# Patient Record
Sex: MALE | Race: BLACK OR AFRICAN AMERICAN | NOT HISPANIC OR LATINO | Employment: UNEMPLOYED | URBAN - METROPOLITAN AREA
[De-identification: names, ages, dates, MRNs, and addresses within clinical notes are randomized per-mention and may not be internally consistent; named-entity substitution may affect disease eponyms.]

---

## 2017-01-23 ENCOUNTER — TELEPHONE (OUTPATIENT)
Dept: OPHTHALMOLOGY | Facility: CLINIC | Age: 42
End: 2017-01-23

## 2017-01-24 ENCOUNTER — INITIAL CONSULT (OUTPATIENT)
Dept: OPHTHALMOLOGY | Facility: CLINIC | Age: 42
End: 2017-01-24
Payer: MEDICAID

## 2017-01-24 DIAGNOSIS — H33.022 RETINAL DETACHMENT OF LEFT EYE WITH MULTIPLE BREAKS: Primary | ICD-10-CM

## 2017-01-24 DIAGNOSIS — H27.02 APHAKIA, LEFT EYE: ICD-10-CM

## 2017-01-24 DIAGNOSIS — H31.012 MACULAR SCAR OF LEFT EYE: ICD-10-CM

## 2017-01-24 PROCEDURE — 92225 PR SPECIAL EYE EXAM, INITIAL: CPT | Mod: PBBFAC,LT | Performed by: OPHTHALMOLOGY

## 2017-01-24 PROCEDURE — 99999 PR PBB SHADOW E&M-EST. PATIENT-LVL II: CPT | Mod: PBBFAC,,, | Performed by: OPHTHALMOLOGY

## 2017-01-24 PROCEDURE — 92004 COMPRE OPH EXAM NEW PT 1/>: CPT | Mod: S$PBB,,, | Performed by: OPHTHALMOLOGY

## 2017-01-24 PROCEDURE — 92225 PR SPECIAL EYE EXAM, INITIAL: CPT | Mod: S$PBB,LT,, | Performed by: OPHTHALMOLOGY

## 2017-01-24 PROCEDURE — 99212 OFFICE O/P EST SF 10 MIN: CPT | Mod: PBBFAC | Performed by: OPHTHALMOLOGY

## 2017-01-24 NOTE — MR AVS SNAPSHOT
"    [facility] - Ophthalmology  [address]  [place] [address]  Phone: [phone]  Fax: [phone]                  Cristiano German   2017 1:30 PM   Initial consult    Description:  Male : 1975   Provider:  SIRENA Leong MD   Department:  [facility] - Ophthalmology           Reason for Visit     Eye Problem           Diagnoses this Visit        Comments    Retinal detachment of left eye with multiple breaks    -  Primary     Macular scar of left eye         Aphakia, left eye                To Do List           Goals (5 Years of Data)     None      Follow-Up and Disposition     Return in about 1 year (around 2018).      [facility]     [facility] Call Nurse [facility] -  Assistance  Registered nurses in the [facility] Call Center provide clinical advisement, health education, appointment booking, and other advisory services.  Call for this free service at [phone].             Medications           Message regarding Medications     Verify the changes and/or additions to your medication regime listed below are the same as discussed with your clinician today.  If any of these changes or additions are incorrect, please notify your healthcare provider.             Verify that the below list of medications is an accurate representation of the medications you are currently taking.  If none reported, the list may be blank. If incorrect, please contact your healthcare provider. Carry this list with you in case of emergency.           Current Medications     blood sugar diagnostic (RELION PRIME TEST STRIPS) Strp 1 strip by Misc.(Non-Drug; Combo Route) route 2 (two) times daily.    blood-glucose meter kit Use as instructed    insulin glargine, TOUJEO, (TOUJEO SOLOSTAR) 300 unit/mL (1.5 mL) InPn pen Inject 25 Units into the skin once daily.    insulin syringe-needle U-100 1/2 mL 31 x 5/16" Syrg 1 Syringe by Misc.(Non-Drug; Combo Route) route 2 (two) times daily.    lancets " (RELION ULTRA THIN PLUS LANCETS) Misc 1 lancet by Misc.(Non-Drug; Combo Route) route 2 (two) times daily.    lisinopril (PRINIVIL,ZESTRIL) 5 MG tablet Take 1 tablet (5 mg total) by mouth once daily.           Clinical Reference Information           Allergies as of 1/24/2017     No Known Allergies      Immunizations Administered on Date of Encounter - 1/24/2017     None      [facility] Sign-Up     Activating your [facility] account is as easy as 1-2-3!     1) Visit [email or web address], select Sign Up Now, enter this activation code and your date of birth, then select Next.  [email or web address]  Expires: 3/10/2017  3:25 PM      2) Create a username and password to use when you visit [facility] in the future and select a security question in case you lose your password and select Next.    3) Enter your e-mail address and click Sign Up!    Additional Information  If you have questions, please e-mail [email or web address] or call [phone] to talk to our [facility] staff. Remember, [facility] is NOT to be used for urgent needs. For medical emergencies, dial 911.

## 2017-01-24 NOTE — LETTER
January 24, 2017      [facility]  [address]  [address]  [place] [address]           [facility] - Ophthalmology  [address]  [place] [address]  Phone: [phone]  Fax: [phone]          Patient: Cristiano German   MR Number: [id number]   YOB: 1975   Date of Visit: 1/24/2017       Dear [facility]:    Thank you for referring Cristiano German to me for evaluation. Attached you will find relevant portions of my assessment and plan of care.    If you have questions, please do not hesitate to call me. I look forward to following Cristiano German along with you.    Sincerely,    SIRENA Leong MD    Enclosure  CC:  No Recipients    If you would like to receive this communication electronically, please contact [email or web address] or [phone] to request more information on "[employer] Link access.    For providers and/or their staff who would like to refer a patient to [facility], please contact us through our one-stop-shop provider referral line, [facility], at [phone].    If you feel you have received this communication in error or would no longer like to receive these types of communications, please e-mail [email or web address]

## 2017-01-24 NOTE — PROGRESS NOTES
OCT  OD normal contour,   OS central subretinal fibrosis atrophy of IS/OS, RPE mottling      A/P  1. Macular Scar OS    2. Retinal detachment OS  - exam reveals SB with macular scar, retina flat, some areas of vitreous contraction - pt has no recollection of RD repair  - poor visual prognosis OS  - monocular precautions      3  Aphakia OS  - pt states when young child had cataract surgery     4. Sensory XT  From # 1      RTC 1 year

## 2017-03-31 ENCOUNTER — HOSPITAL ENCOUNTER (EMERGENCY)
Facility: HOSPITAL | Age: 42
Discharge: HOME | End: 2017-04-01
Attending: EMERGENCY MEDICINE
Payer: MEDICAID

## 2017-03-31 DIAGNOSIS — E11.65 TYPE 2 DIABETES MELLITUS WITH HYPERGLYCEMIA, WITHOUT LONG-TERM CURRENT USE OF INSULIN: Primary | ICD-10-CM

## 2017-03-31 DIAGNOSIS — E11.10 DKA (DIABETIC KETOACIDOSES): ICD-10-CM

## 2017-03-31 LAB
ALBUMIN SERPL BCP-MCNC: 4.1 G/DL
ALP SERPL-CCNC: 125 U/L
ALT SERPL W/O P-5'-P-CCNC: 49 U/L
ANION GAP SERPL CALC-SCNC: 11 MMOL/L
AST SERPL-CCNC: 28 U/L
B-OH-BUTYR BLD STRIP-SCNC: 0.2 MMOL/L
BASOPHILS # BLD AUTO: 0.02 K/UL
BASOPHILS NFR BLD: 0.2 %
BILIRUB SERPL-MCNC: 0.4 MG/DL
BNP SERPL-MCNC: <10 PG/ML
BUN SERPL-MCNC: 17 MG/DL
CALCIUM SERPL-MCNC: 9.4 MG/DL
CHLORIDE SERPL-SCNC: 99 MMOL/L
CO2 SERPL-SCNC: 22 MMOL/L
CREAT SERPL-MCNC: 1.5 MG/DL
DIFFERENTIAL METHOD: ABNORMAL
EOSINOPHIL # BLD AUTO: 0.2 K/UL
EOSINOPHIL NFR BLD: 2.8 %
ERYTHROCYTE [DISTWIDTH] IN BLOOD BY AUTOMATED COUNT: 12.4 %
EST. GFR  (AFRICAN AMERICAN): >60 ML/MIN/1.73 M^2
EST. GFR  (NON AFRICAN AMERICAN): 58 ML/MIN/1.73 M^2
GLUCOSE SERPL-MCNC: 492 MG/DL
HCT VFR BLD AUTO: 40.5 %
HGB BLD-MCNC: 14.6 G/DL
LYMPHOCYTES # BLD AUTO: 3.8 K/UL
LYMPHOCYTES NFR BLD: 46 %
MCH RBC QN AUTO: 29.9 PG
MCHC RBC AUTO-ENTMCNC: 36 %
MCV RBC AUTO: 83 FL
MONOCYTES # BLD AUTO: 0.6 K/UL
MONOCYTES NFR BLD: 7.1 %
NEUTROPHILS # BLD AUTO: 3.6 K/UL
NEUTROPHILS NFR BLD: 43.5 %
PLATELET # BLD AUTO: 383 K/UL
PMV BLD AUTO: 9.9 FL
POCT GLUCOSE: 474 MG/DL (ref 70–110)
POTASSIUM SERPL-SCNC: 4.1 MMOL/L
PROT SERPL-MCNC: 7.8 G/DL
RBC # BLD AUTO: 4.88 M/UL
SODIUM SERPL-SCNC: 132 MMOL/L
TROPONIN I SERPL DL<=0.01 NG/ML-MCNC: <0.006 NG/ML
WBC # BLD AUTO: 8.18 K/UL

## 2017-03-31 PROCEDURE — 82962 GLUCOSE BLOOD TEST: CPT

## 2017-03-31 PROCEDURE — 80053 COMPREHEN METABOLIC PANEL: CPT

## 2017-03-31 PROCEDURE — 83880 ASSAY OF NATRIURETIC PEPTIDE: CPT

## 2017-03-31 PROCEDURE — 99284 EMERGENCY DEPT VISIT MOD MDM: CPT | Mod: 25

## 2017-03-31 PROCEDURE — 82010 KETONE BODYS QUAN: CPT

## 2017-03-31 PROCEDURE — 96361 HYDRATE IV INFUSION ADD-ON: CPT

## 2017-03-31 PROCEDURE — 85025 COMPLETE CBC W/AUTO DIFF WBC: CPT

## 2017-03-31 PROCEDURE — 93005 ELECTROCARDIOGRAM TRACING: CPT

## 2017-03-31 PROCEDURE — 96374 THER/PROPH/DIAG INJ IV PUSH: CPT

## 2017-03-31 PROCEDURE — 84484 ASSAY OF TROPONIN QUANT: CPT

## 2017-03-31 NOTE — ED AVS SNAPSHOT
[facility]  [address] [address]               Cristiano German   3/31/2017 11:45 PM   ED    Description:  Male : 1975   Department:  [facility]           Your Care was Coordinated By:     Provider Role From To    Marisa Vasquez MD Attending Provider 17 [phone] --      Reason for Visit     Hyperglycemic           Diagnoses this Visit        Comments    Type 2 diabetes mellitus with hyperglycemia, without long-term current use of insulin    -  Primary     DKA (diabetic ketoacidoses)           ED Disposition     ED Disposition Condition Comment    Discharge             To Do List           Follow-up Information     [facility]      [facility]     [facility] -  Assistance  Unless otherwise directed by your provider, please contact [facility], our nurse care line that is available for  assistance.     Registered nurses in the [facility] Center provide: appointment scheduling, clinical advisement, health education, and other advisory services.  Call: [phone] (toll free)               Medications           Message regarding Medications     Verify the changes and/or additions to your medication regime listed below are the same as discussed with your clinician today.  If any of these changes or additions are incorrect, please notify your healthcare provider.        These medications were administered today        Dose Freq    sodium chloride 0.9% bolus 1,000 mL 1,000 mL ED 1 Time    Sig: Inject 1,000 mLs into the vein ED 1 Time.    Class: Normal    Route: Intravenous    sodium chloride 0.9% bolus 1,000 mL 1,000 mL ED 1 Time    Sig: Inject 1,000 mLs into the vein ED 1 Time.    Class: Normal    Route: Intravenous    insulin regular injection 5 Units 5 Units ED 1 Time    Sig: Inject 5 Units into the vein ED 1 Time.    Class: Normal    Route:  "Intravenous           Verify that the below list of medications is an accurate representation of the medications you are currently taking.  If none reported, the list may be blank. If incorrect, please contact your healthcare provider. Carry this list with you in case of emergency.           Current Medications     glipiZIDE (GLUCOTROL) 5 MG tablet Take 5 mg by mouth 2 (two) times daily before meals.    metformin (GLUCOPHAGE) 1000 MG tablet Take 1,000 mg by mouth 2 (two) times daily with meals.    blood sugar diagnostic (RELION PRIME TEST STRIPS) Strp 1 strip by Misc.(Non-Drug; Combo Route) route 2 (two) times daily.    blood-glucose meter kit Use as instructed    insulin glargine, TOUJEO, (TOUJEO SOLOSTAR) 300 unit/mL (1.5 mL) InPn pen Inject 25 Units into the skin once daily.    insulin syringe-needle U-100 1/2 mL 31 x 5/16" Syrg 1 Syringe by Misc.(Non-Drug; Combo Route) route 2 (two) times daily.    lancets (RELION ULTRA THIN PLUS LANCETS) Misc 1 lancet by Misc.(Non-Drug; Combo Route) route 2 (two) times daily.    lisinopril (PRINIVIL,ZESTRIL) 5 MG tablet Take 1 tablet (5 mg total) by mouth once daily.           Clinical Reference Information           Your Vitals Were     BP Pulse Temp Resp Height Weight    111/59 78 98 °F (36.7 °C) (Oral) 18 5' 9" (1.753 m) 145.6 kg (321 lb)    SpO2 BMI             89% 47.4 kg/m2         Allergies as of 4/1/2017     No Known Allergies      Immunizations Administered on Date of Encounter - 4/1/2017     None      ED Micro, Lab, POCT     Start Ordered       Status Ordering Provider    04/01/17 0330 04/01/17 0330  POCT glucose  Once      Final result     04/01/17 0219 04/01/17 0219  POCT glucose  Once      Final result     04/01/17 0036 04/01/17 0036  ISTAT PROCEDURE  Once      Final result     04/01/17 0013 04/01/17 0013  POCT glucose  Once      Final result     03/31/17 2225 03/31/17 2225  POCT glucose  Once      Final result     03/31/17 2225 03/31/17 2224    Once,   Status:  " Canceled      Canceled     03/31/17 2223 03/31/17 2222  Troponin I  STAT      Final result     03/31/17 2223 03/31/17 2222  Brain natriuretic peptide  STAT      Final result     03/31/17 2222 03/31/17 2222  CBC auto differential  STAT      Final result     03/31/17 2222 03/31/17 2222  Comprehensive metabolic panel  STAT      Final result     03/31/17 2222 03/31/17 2222  Beta - Hydroxybutyrate, Serum  STAT      Final result     03/31/17 2222 03/31/17 2222  POCT glucose  Once      Acknowledged     03/31/17 2222 03/31/17 2222  Urinalysis - Clean Catch  STAT      Final result     03/31/17 2222 03/31/17 2222  Urinalysis Microscopic  Once      Final result       ED Imaging Orders     Start Ordered       Status Ordering Provider    03/31/17 2224 03/31/17 2223  X-Ray Chest PA And Lateral  1 time imaging      Final result     03/31/17 2222 03/31/17 2222    1 time imaging,   Status:  Canceled      Canceled         Discharge Instructions       Drink plenty of fluids to stay well-hydrated.  Continue diabetes medicines as previously prescribed.  Adhere to diabetic diet.  Return to emergency department for worsening symptoms, persistently elevated glucose, chest pain, shortness of breath, vomiting, unable to tolerate medicines, or any other concerns.    Discharge References/Attachments     DIABETES WITH HIGH BLOOD SUGAR (ENGLISH)    DIABETES, DIET (ENGLISH)      [facility] Sign-Up     Activating your [facility] account is as easy as 1-2-3!     1) Visit [email or web address], select Sign Up Now, enter this activation code and your date of birth, then select Next.  [email or web address]  Expires: 5/16/2017  3:53 AM      2) Create a username and password to use when you visit [facility] in the future and select a security question in case you lose your password and select Next.    3) Enter your e-mail address and click Sign Up!    Additional Information  If you have questions, please e-mail [email or web address] or call [phone] to talk to  our [facility] staff. Remember, [facility] is NOT to be used for urgent needs. For medical emergencies, dial 911.          [facility] complies with applicable Federal civil rights laws and does not discriminate on the basis of race, color, national origin, age, disability, or sex.        Language Assistance Services     ATTENTION: Language assistance services are available, free of charge. Please call [phone].      ATENCIÓN: Si habla español, tiene a thomason disposición servicios gratuitos de asistencia lingüística. Llame al [phone].     CHÚ Ý: N?u b?n nói Ti?ng Vi?t, có các d?ch v? h? tr? ngôn ng? mi?n phí dành cho b?n. G?i s? [phone].

## 2017-04-01 VITALS
BODY MASS INDEX: 46.65 KG/M2 | HEIGHT: 69 IN | HEART RATE: 78 BPM | OXYGEN SATURATION: 85 % | TEMPERATURE: 98 F | SYSTOLIC BLOOD PRESSURE: 114 MMHG | RESPIRATION RATE: 18 BRPM | DIASTOLIC BLOOD PRESSURE: 62 MMHG | WEIGHT: 315 LBS

## 2017-04-01 LAB
ALLENS TEST: ABNORMAL
BACTERIA #/AREA URNS HPF: NORMAL /HPF
BILIRUB UR QL STRIP: NEGATIVE
CLARITY UR: CLEAR
COLOR UR: YELLOW
DELSYS: ABNORMAL
FIO2: 21
GLUCOSE UR QL STRIP: ABNORMAL
HCO3 UR-SCNC: 25.6 MMOL/L (ref 24–28)
HCT VFR BLD CALC: 42 %PCV (ref 36–54)
HGB UR QL STRIP: NEGATIVE
KETONES UR QL STRIP: ABNORMAL
LEUKOCYTE ESTERASE UR QL STRIP: NEGATIVE
MICROSCOPIC COMMENT: NORMAL
MODE: ABNORMAL
NITRITE UR QL STRIP: NEGATIVE
PCO2 BLDA: 44.1 MMHG (ref 35–45)
PH SMN: 7.37 [PH] (ref 7.35–7.45)
PH UR STRIP: 5 [PH] (ref 5–8)
PO2 BLDA: 40 MMHG (ref 40–60)
POC BE: 0 MMOL/L
POC IONIZED CALCIUM: 1.21 MMOL/L (ref 1.06–1.42)
POC SATURATED O2: 73 % (ref 95–100)
POC TCO2: 27 MMOL/L (ref 24–29)
POCT GLUCOSE: 298 MG/DL (ref 70–110)
POCT GLUCOSE: 362 MG/DL (ref 70–110)
POCT GLUCOSE: 407 MG/DL (ref 70–110)
POTASSIUM BLD-SCNC: 3.7 MMOL/L (ref 3.5–5.1)
PROT UR QL STRIP: NEGATIVE
RBC #/AREA URNS HPF: 1 /HPF (ref 0–4)
SAMPLE: ABNORMAL
SITE: ABNORMAL
SODIUM BLD-SCNC: 135 MMOL/L (ref 136–145)
SP GR UR STRIP: >1.03 (ref 1–1.03)
SP02: 96
URN SPEC COLLECT METH UR: ABNORMAL
UROBILINOGEN UR STRIP-ACNC: NEGATIVE EU/DL
WBC #/AREA URNS HPF: 1 /HPF (ref 0–5)
YEAST URNS QL MICRO: NORMAL

## 2017-04-01 PROCEDURE — 25000003 PHARM REV CODE 250: Performed by: EMERGENCY MEDICINE

## 2017-04-01 PROCEDURE — 81000 URINALYSIS NONAUTO W/SCOPE: CPT

## 2017-04-01 PROCEDURE — 99900035 HC TECH TIME PER 15 MIN (STAT)

## 2017-04-01 PROCEDURE — 82803 BLOOD GASES ANY COMBINATION: CPT

## 2017-04-01 PROCEDURE — 63600175 PHARM REV CODE 636 W HCPCS: Performed by: EMERGENCY MEDICINE

## 2017-04-01 RX ORDER — METFORMIN HYDROCHLORIDE 1000 MG/1
1000 TABLET ORAL 2 TIMES DAILY WITH MEALS
COMMUNITY
End: 2020-03-20 | Stop reason: CLARIF

## 2017-04-01 RX ORDER — GLIPIZIDE 5 MG/1
5 TABLET ORAL
COMMUNITY
End: 2020-03-20 | Stop reason: CLARIF

## 2017-04-01 RX ADMIN — SODIUM CHLORIDE 1000 ML: 0.9 INJECTION, SOLUTION INTRAVENOUS at 12:04

## 2017-04-01 RX ADMIN — INSULIN HUMAN 5 UNITS: 100 INJECTION, SOLUTION PARENTERAL at 03:04

## 2017-04-01 NOTE — ED TRIAGE NOTES
"Patient comes to ED from home with hyperglycemia. Patient reports they took their bg at home and "it was real high". Patient reports taking medication. Patient reports feeling thirsty and dry throat. Patient denies cp or sob. Patient denies n/v/d. Patient denies cough or cold. Will monitor.  "

## 2017-04-01 NOTE — DISCHARGE INSTRUCTIONS
Drink plenty of fluids to stay well-hydrated.  Continue diabetes medicines as previously prescribed.  Adhere to diabetic diet.  Return to emergency department for worsening symptoms, persistently elevated glucose, chest pain, shortness of breath, vomiting, unable to tolerate medicines, or any other concerns.

## 2017-04-01 NOTE — ED PROVIDER NOTES
"Encounter Date: 3/31/2017    SCRIBE #1 NOTE: I, Eda Chucky, am scribing for, and in the presence of,  Marisa Vasquez MD. I have scribed the following portions of the note - Other sections scribed: HPI, ROS.       History     Chief Complaint   Patient presents with    Hyperglycemic     pt reports sugar >500 at home     Review of patient's allergies indicates:  No Known Allergies  HPI Comments: CC: Hyperglycemia    HPI: 41 y.o. M with DM and HTN presents to the ED for an evaluation of an elevated blood glucose level today. Pt is c/o associated "dry mouth," polydipsia, polyuria, bilateral blurry vision, and L lower back pain. He reports symptoms are consistent when his blood sugar levels are high. Pt ate turkey and Popeyes today. He reports CBG of "500 something" prior to eating and CBG of 580 after meal. Pt states he is on treatment with metformin and glipizide. He reports he is compliant with his medications. Last dose was at 5:00 pm today. Of note, pt reports he was evaluated at [facility] last week for similar symptoms. Pt denies fever, chills, chest pain, SOB, abdominal pain, N/V/D, dizziness, and any other associated symptoms. No alleviating factors.    The history is provided by the patient. No  was used.     Past Medical History:   Diagnosis Date    Diabetes mellitus 1/4/2016    Hypertension      Past Surgical History:   Procedure Laterality Date    CATARACT EXTRACTION       Family History   Problem Relation Age of Onset    Cancer Mother      lung    Hypertension Brother      Social History   Substance Use Topics    Smoking status: Never Smoker    Smokeless tobacco: Never Used    Alcohol use Yes      Comment: social drinker- few drinks per year     Review of Systems   Constitutional: Negative for chills, diaphoresis and fever.   HENT: Negative for ear pain and sore throat.         (+) "dry mouth"   Eyes: Positive for visual disturbance (bilateral blurry vision). Negative for " photophobia.   Respiratory: Negative for cough and shortness of breath.    Cardiovascular: Negative for chest pain.   Gastrointestinal: Negative for abdominal pain, diarrhea, nausea and vomiting.   Endocrine: Positive for polydipsia and polyuria.   Genitourinary: Negative for dysuria.   Musculoskeletal: Positive for back pain (L lower).   Skin: Negative for rash.   Neurological: Negative for dizziness and headaches.       Physical Exam   Initial Vitals   BP Pulse Resp Temp SpO2   03/31/17 2220 03/31/17 2220 03/31/17 2220 03/31/17 2220 03/31/17 2220   120/70 89 18 98.2 °F (36.8 °C) 96 %     Physical Exam    Constitutional: He appears well-developed and well-nourished. He is Obese .   HENT:   Head: Normocephalic.   Eyes: Conjunctivae and EOM are normal.   Neck: Neck supple.   Cardiovascular: Normal rate, regular rhythm and normal heart sounds. Exam reveals no gallop and no friction rub.    No murmur heard.  Pulmonary/Chest: Breath sounds normal. No stridor. He has no wheezes. He has no rhonchi. He has no rales.   Abdominal: Soft. Bowel sounds are normal. He exhibits no distension and no pulsatile midline mass. There is no tenderness. There is no rebound and no guarding.   Musculoskeletal: Normal range of motion. He exhibits no edema.   Neurological: He is alert and oriented to person, place, and time. He has normal strength. No cranial nerve deficit.   Skin: Skin is warm and dry.         ED Course   Procedures  Labs Reviewed   CBC W/ AUTO DIFFERENTIAL - Abnormal; Notable for the following:        Result Value    Platelets 383 (*)     All other components within normal limits   COMPREHENSIVE METABOLIC PANEL - Abnormal; Notable for the following:     Sodium 132 (*)     CO2 22 (*)     Glucose 492 (*)     Creatinine 1.5 (*)     ALT 49 (*)     eGFR if non  58 (*)     All other components within normal limits    Narrative:       Glucose critical result(s) called and verbal readback obtained from   Lexi  New,Rn, 03/31/2017 23:53   URINALYSIS - Abnormal; Notable for the following:     Specific Gravity, UA >1.030 (*)     Glucose, UA 3+ (*)     Ketones, UA Trace (*)     All other components within normal limits   POCT GLUCOSE - Abnormal; Notable for the following:     POCT Glucose 474 (*)     All other components within normal limits   POCT GLUCOSE - Abnormal; Notable for the following:     POCT Glucose 407 (*)     All other components within normal limits   ISTAT PROCEDURE - Abnormal; Notable for the following:     POC SATURATED O2 73 (*)     POC Sodium 135 (*)     All other components within normal limits   POCT GLUCOSE - Abnormal; Notable for the following:     POCT Glucose 362 (*)     All other components within normal limits   POCT GLUCOSE - Abnormal; Notable for the following:     POCT Glucose 298 (*)     All other components within normal limits   BETA - HYDROXYBUTYRATE, SERUM   TROPONIN I   B-TYPE NATRIURETIC PEPTIDE   URINALYSIS MICROSCOPIC     EKG Readings: (Independently Interpreted)   Normal sinus rhythm, rate approximately 84.  No ST elevation or depression.  .  No evidence of acute ischemia or malignant arrhythmia.          Medical Decision Making:   History:   Old Medical Records: I decided to obtain old medical records.  Differential Diagnosis:   DKA, hyperosmolar nonketotic state, sepsis, dehydration, among others.  41 y.o. male with history of type 2 diabetes, on metformin and glipizide, presents to the emergency department with elevated glucose.  Patient believes his symptoms are directly related to what he ate today.  He denies chest pain or shortness of breath.  He reports glucose greater than 500 home.  At triage, glucose 474.  Labs ordered from triage.  No leukocytosis.  Hemoglobin and hematocrit are normal.  Creatinine is 1.5.  Glucose 492.  Sodium 132, potassium 4.1.  Beta hydroxybutyrate is not elevated.  VBG reveals normal pH.  Acute DKA unlikely.  Troponin and BNP are not elevated.  Chest x-ray independently interpreted by me as negative for acute infiltrates, pneumothorax, effusion, widening mediastinum, or other acute cardiopulmonary process.  Given 2 L normal saline, small dose IV insulin, with improvement.  Glucose is less than 300.  Patient is stable for discharge.  Doubt DKA at this time.  I strongly encouraged adherence to diabetic diet and diabetes medications.  I've encouraged close follow-up with a primary care doctor.  Patient states she feels better after treatment provided in the emergency department. Patient given strict return warnings.  He states understanding discharge plan and is comfortable going home.            Scribe Attestation:   Scribe #1: I performed the above scribed service and the documentation accurately describes the services I performed. I attest to the accuracy of the note.    Attending Attestation:           Physician Attestation for Scribe:  Physician Attestation Statement for Scribe #1: I, Marias Vasquez MD, reviewed documentation, as scribed by Eda Locke in my presence, and it is both accurate and complete.                 ED Course     Clinical Impression:   The primary encounter diagnosis was Type 2 diabetes mellitus with hyperglycemia, without long-term current use of insulin. A diagnosis of DKA (diabetic ketoacidoses) was also pertinent to this visit.          Marisa Vasquez MD  04/06/17 2020

## 2018-02-27 ENCOUNTER — HOSPITAL ENCOUNTER (EMERGENCY)
Facility: HOSPITAL | Age: 43
Discharge: HOME | End: 2018-02-27
Attending: EMERGENCY MEDICINE
Payer: MEDICAID

## 2018-02-27 VITALS
BODY MASS INDEX: 43.95 KG/M2 | WEIGHT: 290 LBS | TEMPERATURE: 99 F | RESPIRATION RATE: 16 BRPM | HEART RATE: 90 BPM | OXYGEN SATURATION: 97 % | SYSTOLIC BLOOD PRESSURE: 112 MMHG | DIASTOLIC BLOOD PRESSURE: 65 MMHG | HEIGHT: 68 IN

## 2018-02-27 DIAGNOSIS — A08.4 VIRAL GASTROENTERITIS: Primary | ICD-10-CM

## 2018-02-27 LAB
ALBUMIN SERPL BCP-MCNC: 3.7 G/DL
ALP SERPL-CCNC: 92 U/L
ALT SERPL W/O P-5'-P-CCNC: 79 U/L
ANION GAP SERPL CALC-SCNC: 10 MMOL/L
AST SERPL-CCNC: 44 U/L
B-OH-BUTYR BLD STRIP-SCNC: 0.1 MMOL/L
BASOPHILS # BLD AUTO: 0.01 K/UL
BASOPHILS NFR BLD: 0.1 %
BILIRUB SERPL-MCNC: 0.7 MG/DL
BILIRUB UR QL STRIP: NEGATIVE
BUN SERPL-MCNC: 14 MG/DL
CALCIUM SERPL-MCNC: 9.4 MG/DL
CHLORIDE SERPL-SCNC: 103 MMOL/L
CLARITY UR: CLEAR
CO2 SERPL-SCNC: 24 MMOL/L
COLOR UR: YELLOW
CREAT SERPL-MCNC: 1.1 MG/DL
DIFFERENTIAL METHOD: ABNORMAL
EOSINOPHIL # BLD AUTO: 0.1 K/UL
EOSINOPHIL NFR BLD: 0.7 %
ERYTHROCYTE [DISTWIDTH] IN BLOOD BY AUTOMATED COUNT: 12.8 %
EST. GFR  (AFRICAN AMERICAN): >60 ML/MIN/1.73 M^2
EST. GFR  (NON AFRICAN AMERICAN): >60 ML/MIN/1.73 M^2
FLUAV AG SPEC QL IA: NEGATIVE
FLUBV AG SPEC QL IA: NEGATIVE
GLUCOSE SERPL-MCNC: 188 MG/DL
GLUCOSE UR QL STRIP: NEGATIVE
HCT VFR BLD AUTO: 41.8 %
HGB BLD-MCNC: 14.3 G/DL
HGB UR QL STRIP: NEGATIVE
KETONES UR QL STRIP: NEGATIVE
LACTATE SERPL-SCNC: 2.1 MMOL/L
LEUKOCYTE ESTERASE UR QL STRIP: NEGATIVE
LIPASE SERPL-CCNC: 16 U/L
LYMPHOCYTES # BLD AUTO: 0.7 K/UL
LYMPHOCYTES NFR BLD: 9.1 %
MCH RBC QN AUTO: 29.3 PG
MCHC RBC AUTO-ENTMCNC: 34.2 G/DL
MCV RBC AUTO: 86 FL
MONOCYTES # BLD AUTO: 0.6 K/UL
MONOCYTES NFR BLD: 6.8 %
NEUTROPHILS # BLD AUTO: 6.7 K/UL
NEUTROPHILS NFR BLD: 83.1 %
NITRITE UR QL STRIP: NEGATIVE
PH UR STRIP: 6 [PH] (ref 5–8)
PLATELET # BLD AUTO: 293 K/UL
PMV BLD AUTO: 9.1 FL
POCT GLUCOSE: 172 MG/DL (ref 70–110)
POTASSIUM SERPL-SCNC: 4 MMOL/L
PROT SERPL-MCNC: 6.9 G/DL
PROT UR QL STRIP: NEGATIVE
RBC # BLD AUTO: 4.88 M/UL
SODIUM SERPL-SCNC: 137 MMOL/L
SP GR UR STRIP: 1.02 (ref 1–1.03)
SPECIMEN SOURCE: NORMAL
URN SPEC COLLECT METH UR: NORMAL
UROBILINOGEN UR STRIP-ACNC: NEGATIVE EU/DL
WBC # BLD AUTO: 8.03 K/UL

## 2018-02-27 PROCEDURE — 25000003 PHARM REV CODE 250: Performed by: NURSE PRACTITIONER

## 2018-02-27 PROCEDURE — 81003 URINALYSIS AUTO W/O SCOPE: CPT

## 2018-02-27 PROCEDURE — 87040 BLOOD CULTURE FOR BACTERIA: CPT

## 2018-02-27 PROCEDURE — 25500020 PHARM REV CODE 255: Performed by: EMERGENCY MEDICINE

## 2018-02-27 PROCEDURE — 80053 COMPREHEN METABOLIC PANEL: CPT

## 2018-02-27 PROCEDURE — 63600175 PHARM REV CODE 636 W HCPCS: Performed by: NURSE PRACTITIONER

## 2018-02-27 PROCEDURE — 96361 HYDRATE IV INFUSION ADD-ON: CPT

## 2018-02-27 PROCEDURE — 85025 COMPLETE CBC W/AUTO DIFF WBC: CPT

## 2018-02-27 PROCEDURE — 96374 THER/PROPH/DIAG INJ IV PUSH: CPT | Mod: 59

## 2018-02-27 PROCEDURE — 99284 EMERGENCY DEPT VISIT MOD MDM: CPT | Mod: 25

## 2018-02-27 PROCEDURE — 82010 KETONE BODYS QUAN: CPT

## 2018-02-27 PROCEDURE — 87400 INFLUENZA A/B EACH AG IA: CPT | Mod: 59

## 2018-02-27 PROCEDURE — 83690 ASSAY OF LIPASE: CPT

## 2018-02-27 PROCEDURE — 83605 ASSAY OF LACTIC ACID: CPT

## 2018-02-27 PROCEDURE — 96372 THER/PROPH/DIAG INJ SC/IM: CPT | Mod: 59

## 2018-02-27 PROCEDURE — 82962 GLUCOSE BLOOD TEST: CPT

## 2018-02-27 RX ORDER — HYOSCYAMINE SULFATE 0.125 MG
125 TABLET ORAL EVERY 4 HOURS PRN
Qty: 6 TABLET | Refills: 0 | Status: SHIPPED | OUTPATIENT
Start: 2018-02-27 | End: 2020-03-20

## 2018-02-27 RX ORDER — ACETAMINOPHEN 500 MG
1000 TABLET ORAL
Status: COMPLETED | OUTPATIENT
Start: 2018-02-27 | End: 2018-02-27

## 2018-02-27 RX ORDER — ONDANSETRON 2 MG/ML
8 INJECTION INTRAMUSCULAR; INTRAVENOUS
Status: COMPLETED | OUTPATIENT
Start: 2018-02-27 | End: 2018-02-27

## 2018-02-27 RX ORDER — DICYCLOMINE HYDROCHLORIDE 10 MG/ML
20 INJECTION INTRAMUSCULAR
Status: COMPLETED | OUTPATIENT
Start: 2018-02-27 | End: 2018-02-27

## 2018-02-27 RX ORDER — ONDANSETRON 4 MG/1
8 TABLET, FILM COATED ORAL EVERY 8 HOURS PRN
Qty: 14 TABLET | Refills: 0 | Status: SHIPPED | OUTPATIENT
Start: 2018-02-27 | End: 2020-03-20

## 2018-02-27 RX ADMIN — DICYCLOMINE HYDROCHLORIDE 20 MG: 20 INJECTION, SOLUTION INTRAMUSCULAR at 05:02

## 2018-02-27 RX ADMIN — IOHEXOL 100 ML: 350 INJECTION, SOLUTION INTRAVENOUS at 07:02

## 2018-02-27 RX ADMIN — ACETAMINOPHEN 1000 MG: 500 TABLET ORAL at 05:02

## 2018-02-27 RX ADMIN — ONDANSETRON 8 MG: 2 INJECTION, SOLUTION INTRAMUSCULAR; INTRAVENOUS at 05:02

## 2018-02-27 RX ADMIN — SODIUM CHLORIDE 1000 ML: 0.9 INJECTION, SOLUTION INTRAVENOUS at 05:02

## 2018-02-27 NOTE — ED PROVIDER NOTES
"Encounter Date: 2/27/2018       History     Chief Complaint   Patient presents with    Fever     42 year old male presents to ed cc of fever cough weakness x 1 day. patient states diarrhea x 5     43yo male with pmhx of DM and HTN presents to the ED for abd pain with fever, nausea, and diarrhea which started today.  Pt's wife reports that she recently had a "stomach bug" which consisted of n/v/d for a few days.  Pt states that his symptoms started today after eating a chicken wrap.  Pt reports about 8 episodes of nonbloody diarrhea, diffuse abd cramping, and nausea.  He reports fever of 101.2.  No history of abd surgery.  Pt reports that his blood sugar was noted to be over 300 today at home.  He denies cough to me as reported to triage.  No other complaints at this time.       The history is provided by the patient.   Abdominal Pain   The current episode started 2 to 3 hours ago. The onset of the illness was gradual. The problem has been gradually worsening. The abdominal pain is generalized. The abdominal pain does not radiate. The severity of the abdominal pain is 8/10. The abdominal pain is relieved by nothing. Exacerbated by: movement. The other symptoms of the illness include nausea and diarrhea. The other symptoms of the illness do not include fever, fatigue, shortness of breath, vomiting, dysuria, hematemesis or hematochezia.   The diarrhea began today. The diarrhea is watery. The diarrhea occurs 5 - 10 times per day. Risk factors for illness producing diarrhea include suspect food intake.   Nausea began today. The nausea is associated with eating. The nausea is exacerbated by food.   The patient has not had a change in bowel habit. Additional symptoms associated with the illness include anorexia. Symptoms associated with the illness do not include chills, diaphoresis, heartburn, constipation, urgency, hematuria, frequency or back pain. Significant associated medical issues include diabetes. Significant " associated medical issues do not include GERD or inflammatory bowel disease.     Review of patient's allergies indicates:  No Known Allergies  Past Medical History:   Diagnosis Date    Diabetes mellitus 1/4/2016    Hypertension      Past Surgical History:   Procedure Laterality Date    CATARACT EXTRACTION       Family History   Problem Relation Age of Onset    Cancer Mother      lung    Hypertension Brother      Social History   Substance Use Topics    Smoking status: Never Smoker    Smokeless tobacco: Never Used    Alcohol use Yes      Comment: social drinker- few drinks per year     Review of Systems   Constitutional: Negative for activity change, chills, diaphoresis, fatigue and fever.   HENT: Negative for congestion.    Respiratory: Negative for cough and shortness of breath.    Cardiovascular: Negative for chest pain.   Gastrointestinal: Positive for abdominal pain, anorexia, diarrhea and nausea. Negative for constipation, heartburn, hematemesis, hematochezia and vomiting.   Genitourinary: Negative for dysuria, frequency, hematuria and urgency.   Musculoskeletal: Negative for back pain, joint swelling, myalgias and neck pain.   Skin: Negative.    Neurological: Negative for weakness and headaches.   Psychiatric/Behavioral: Negative for confusion.   All other systems reviewed and are negative.      Physical Exam     Initial Vitals [02/27/18 1543]   BP Pulse Resp Temp SpO2   136/77 108 20 (!) 100.6 °F (38.1 °C) 98 %      MAP       96.67         Physical Exam    Nursing note and vitals reviewed.  Constitutional: Vital signs are normal. He appears well-developed and well-nourished. He is Obese . He is cooperative. He is easily aroused.  Non-toxic appearance. He does not have a sickly appearance. He appears ill. No distress.   HENT:   Head: Normocephalic and atraumatic.   Eyes: Conjunctivae are normal.   Neck: Normal range of motion.   Cardiovascular: Normal rate, regular rhythm and normal heart sounds.    Pulmonary/Chest: Effort normal and breath sounds normal.   Abdominal: Soft. Normal appearance. He exhibits no distension. Bowel sounds are increased. There is generalized tenderness. There is no rigidity, no rebound and no guarding.   Neurological: He is alert, oriented to person, place, and time and easily aroused. GCS eye subscore is 4. GCS verbal subscore is 5. GCS motor subscore is 6.   Skin: Skin is warm, dry and intact. No bruising and no rash noted. No pallor.   Psychiatric: He has a normal mood and affect. His speech is normal and behavior is normal. Judgment and thought content normal. Cognition and memory are normal.         ED Course   Procedures  Labs Reviewed   CBC W/ AUTO DIFFERENTIAL - Abnormal; Notable for the following:        Result Value    MPV 9.1 (*)     Lymph # 0.7 (*)     Gran% 83.1 (*)     Lymph% 9.1 (*)     All other components within normal limits   COMPREHENSIVE METABOLIC PANEL - Abnormal; Notable for the following:     Glucose 188 (*)     AST 44 (*)     ALT 79 (*)     All other components within normal limits   POCT GLUCOSE - Abnormal; Notable for the following:     POCT Glucose 172 (*)     All other components within normal limits   CULTURE, BLOOD   LIPASE   LACTIC ACID, PLASMA   BETA - HYDROXYBUTYRATE, SERUM   INFLUENZA A AND B ANTIGEN   URINALYSIS   POCT GLUCOSE MONITORING CONTINUOUS         Imaging Results    None              Medical Decision Making:   Initial Assessment:   42-year-old male with past medical history of diabetes and hypertension is here for generalized abdominal pain, nausea, and nonbloody diarrhea which started today after eating a chicken wrap.  He does report fever and elevated accucheck at home today >300.  The patient appears ill but nontoxic.  Vitals stable.  Mucous membranes moist.  Diffuse abdominal tenderness without rebound, rigidity, or guarding.  No distention.  No CVA tenderness.  Bowel sounds hyperactive.  Differential Diagnosis:   GERD, intestinal  spasm, gastroenteritis, gastritis, ulcer, cholecystitis, gallstones, pancreatitis, ileus, small bowel obstruction, appendicitis, constipation, intestinal gas pain, DKA, hyperglycemia, influenza, electrolyte derangement, dehyration  Clinical Tests:   Lab Tests: Ordered and Reviewed  Radiological Study: Ordered and Reviewed  ED Management:  Iv fluids, IV zofran, IM bentyl, CT abd/pelvis    2130: pt CT neg.  Pt re-evaluated.  He is sleeping and comfortable. I have discussed CT and lab findings with pt and his wife.  Symptoms most likely due to viral process.  Pt will f/u with pcp and will return to ED immediately if symptoms worsen.  Pt educated on clear liquid and bland diet prior to discharge.     Pt counseled on their diagnosis and the importance of following up with PCP.  Pt also cautioned on when to return to ED.  Pt verbalizes understanding of discharge plan and will return to ED immediately if symptoms worsen                     ED Course as of Feb 27 2137   Tue Feb 27, 2018   1907 Pt asleep. Awaiting labs and CT scan.   [JS]   1958 HR 84, Temp 98.8.  Pt reports that he is feeling much better.  Awaiting CT scan results.   [JS]   2001 Pt turned over to  for disposition.  I anticipate discharge.    [JS]      ED Course User Index  [JS] JUS Almodovar     Clinical Impression:   The encounter diagnosis was Viral gastroenteritis.    Disposition:   Disposition: Discharged  Condition: Stable                        Faith Sandy MD  02/28/18 1108

## 2018-02-28 NOTE — DISCHARGE INSTRUCTIONS
[place] diet x 48 hours    Drink plenty of fluids    Follow up with primary care in 1 week    Return to ED if symptoms worsen

## 2018-02-28 NOTE — ED NOTES
Fever, nausea and diarrhea that began today around 2 pm.  Wife recently had similar symptoms and is now recovering.  Denies vomiting. Pt reports bilateral lower back pain that increases with movement

## 2018-03-05 LAB — BACTERIA BLD CULT: NORMAL

## 2018-12-12 ENCOUNTER — HOSPITAL ENCOUNTER (EMERGENCY)
Facility: HOSPITAL | Age: 43
Discharge: HOME | End: 2018-12-12
Attending: EMERGENCY MEDICINE
Payer: MEDICAID

## 2018-12-12 VITALS
DIASTOLIC BLOOD PRESSURE: 82 MMHG | SYSTOLIC BLOOD PRESSURE: 136 MMHG | OXYGEN SATURATION: 97 % | BODY MASS INDEX: 40.62 KG/M2 | HEIGHT: 68 IN | WEIGHT: 268 LBS | RESPIRATION RATE: 18 BRPM | HEART RATE: 78 BPM | TEMPERATURE: 98 F

## 2018-12-12 DIAGNOSIS — M25.511 RIGHT SHOULDER PAIN, UNSPECIFIED CHRONICITY: Primary | ICD-10-CM

## 2018-12-12 PROCEDURE — 99284 EMERGENCY DEPT VISIT MOD MDM: CPT | Mod: 25

## 2018-12-12 PROCEDURE — 63600175 PHARM REV CODE 636 W HCPCS: Performed by: PHYSICIAN ASSISTANT

## 2018-12-12 PROCEDURE — 96372 THER/PROPH/DIAG INJ SC/IM: CPT

## 2018-12-12 RX ORDER — NAPROXEN 500 MG/1
500 TABLET ORAL EVERY 8 HOURS PRN
Qty: 20 TABLET | Refills: 0 | Status: SHIPPED | OUTPATIENT
Start: 2018-12-12 | End: 2020-03-20 | Stop reason: CLARIF

## 2018-12-12 RX ORDER — CYCLOBENZAPRINE HCL 10 MG
10 TABLET ORAL 3 TIMES DAILY PRN
Qty: 15 TABLET | Refills: 0 | Status: SHIPPED | OUTPATIENT
Start: 2018-12-12 | End: 2018-12-17

## 2018-12-12 RX ORDER — KETOROLAC TROMETHAMINE 30 MG/ML
15 INJECTION, SOLUTION INTRAMUSCULAR; INTRAVENOUS
Status: COMPLETED | OUTPATIENT
Start: 2018-12-12 | End: 2018-12-12

## 2018-12-12 RX ADMIN — KETOROLAC TROMETHAMINE 15 MG: 30 INJECTION, SOLUTION INTRAMUSCULAR at 10:12

## 2018-12-12 NOTE — DISCHARGE INSTRUCTIONS
Take medication as directed.    Do your shoulder exercises as discussed.    Schedule a follow-up appointment with orthopedics.    Return to the ER for any concerns.

## 2018-12-12 NOTE — ED PROVIDER NOTES
"Encounter Date: 12/12/2018    SCRIBE #1 NOTE: I, Hermilo Manzo, am scribing for, and in the presence of,  Dacia Quarles PA-C. I have scribed the following portions of the note - Other sections scribed: HPI/ROS.       History     Chief Complaint   Patient presents with    Shoulder Pain     "I got a torn rotator cuff (right) and it hurts wehn I turn my head."     CC: Shoulder Pain     HPI: This 42 y.o. male with a medical hx of HTN and DM presents to the ED for an evaluation of recurrent, severe (10/1) R shoulder pain. Pt reports having a torn, R rotator cuff for which he is being treated for with PT but will be discharged soon. Pt states he went back to work 2 days ago and began to feel pain after lifting very heavy objects. He was never evaluated by an orthopedist. No prior at home tx. No alleviating factors. Otherwise, pt denies fever, chills, n/v/d, abdominal pain, headache, and any other associated symtoms. No further complaints at this time.        The history is provided by the patient. No  was used.     Review of patient's allergies indicates:  No Known Allergies  Past Medical History:   Diagnosis Date    Diabetes mellitus 1/4/2016    Hypertension      Past Surgical History:   Procedure Laterality Date    CATARACT EXTRACTION       Family History   Problem Relation Age of Onset    Cancer Mother         lung    Hypertension Brother      Social History     Tobacco Use    Smoking status: Never Smoker    Smokeless tobacco: Never Used   Substance Use Topics    Alcohol use: Yes     Comment: social drinker- few drinks per year    Drug use: No     Review of Systems   Constitutional: Negative for chills and fever.   HENT: Negative for congestion, ear pain, rhinorrhea and sore throat.    Eyes: Negative for pain and visual disturbance.   Respiratory: Negative for cough and shortness of breath.    Cardiovascular: Negative for chest pain.   Gastrointestinal: Negative for abdominal pain, " diarrhea, nausea and vomiting.   Genitourinary: Negative for decreased urine volume and dysuria.   Musculoskeletal: Positive for arthralgias (R shoulder). Negative for back pain and neck pain.   Skin: Negative for rash.   Neurological: Negative for headaches.   Psychiatric/Behavioral: Negative for confusion.   All other systems reviewed and are negative.      Physical Exam     Initial Vitals [12/12/18 1008]   BP Pulse Resp Temp SpO2   136/82 78 18 98.3 °F (36.8 °C) 97 %      MAP       --         Physical Exam    Nursing note and vitals reviewed.  Constitutional: Vital signs are normal. He appears well-developed and well-nourished. He is not diaphoretic. He is cooperative.  Non-toxic appearance. He does not have a sickly appearance. He does not appear ill. No distress.   HENT:   Head: Normocephalic and atraumatic.   Right Ear: Tympanic membrane, external ear and ear canal normal.   Left Ear: Tympanic membrane, external ear and ear canal normal.   Nose: Nose normal.   Mouth/Throat: Uvula is midline, oropharynx is clear and moist and mucous membranes are normal.   Eyes: Conjunctivae, EOM and lids are normal. Pupils are equal, round, and reactive to light.   Neck: Trachea normal, normal range of motion, full passive range of motion without pain and phonation normal. Neck supple.   Cardiovascular: Normal rate, regular rhythm, normal heart sounds and intact distal pulses. Exam reveals no gallop and no friction rub.    No murmur heard.  Pulmonary/Chest: Effort normal and breath sounds normal. No respiratory distress. He has no decreased breath sounds. He has no wheezes. He has no rhonchi. He has no rales.   Abdominal: Soft. Normal appearance and bowel sounds are normal. He exhibits no distension. There is no tenderness. There is no rigidity, no rebound, no guarding and no CVA tenderness.   Musculoskeletal: Normal range of motion.        Right shoulder: He exhibits pain. He exhibits normal range of motion, no tenderness, no  bony tenderness, no swelling, no effusion, no crepitus, no deformity, no laceration, no spasm and normal pulse.   There is pain with ROM of the right shoulder secondary to torn rotator cuff injury. Patient is able to adduct, abduct, flex and extend. No obvious deformity or sign of trauma. Peripheral sensation and strength intact. Peripheral pulses intact. No crepitus. No edema.     Neurological: He is alert and oriented to person, place, and time. He has normal strength. No cranial nerve deficit or sensory deficit. GCS eye subscore is 4. GCS verbal subscore is 5. GCS motor subscore is 6.   Skin: Skin is warm and dry. Capillary refill takes less than 2 seconds. No rash noted.   Psychiatric: He has a normal mood and affect. His speech is normal and behavior is normal. Judgment and thought content normal. Cognition and memory are normal.         ED Course   Procedures  Labs Reviewed - No data to display       Imaging Results    None                APC / Resident Notes:   This is an evaluation of a 42 y.o. male with right torn rotator cuff, DM, HTN that presents to the Emergency Department for shoulder pain. Patient reports diagnosis of right rotator cuff tear in early 2018.  He reports going to physical therapy with relief of pain, but recently began working again.  He reports lifting a 50-60 lb object at work, exacerbating his right shoulder pain. He denies taking any medication for pain. He denies follow-up with Orthopedics.  Denies any further symptoms at this time.  No new injury or trauma at this time.    Exam findings: Patient is non-toxic, afebrile and well appearing. There is pain with ROM of the right shoulder secondary to torn rotator cuff injury. Patient is able to adduct, abduct, flex and extend. No obvious deformity or sign of trauma. Peripheral sensation and strength intact. Peripheral pulses intact. No crepitus. No edema.      If available, past records have been reviewed.  Vitals are reassuring.    My  overall impression:  Right shoulder pain  DDx:  Shoulder pain, strain, torn rotator cuff    ED course:  I do not feel it is appropriate to image the shoulder as there are no new injuries or trauma since prior imaging studies performed.  Toradol IM.  I will prescribe naproxen and Flexeril.  I will recommend continued exercises for the shoulder.  I will recommend the patient follow up with physical therapy and Orthopedics.  Patient is stable for discharge. ED return precautions given.    The diagnosis and treatment plan have been discussed with the patient. All questions and concerns have been addressed. Patient expressed understanding. An educational information sheet was given to the patient prior to discharge.     Dacia Quarles PA-C         Scribe Attestation:   Scribe #1: I performed the above scribed service and the documentation accurately describes the services I performed. I attest to the accuracy of the note.    Attending Attestation:           Physician Attestation for Scribe:  Physician Attestation Statement for Scribe #1: I, Dacia Quarles PA-C, reviewed documentation, as scribed by Hermilo Manzo in my presence, and it is both accurate and complete.                    Clinical Impression:   The encounter diagnosis was Right shoulder pain, unspecified chronicity.      Disposition:   Disposition: Discharged  Condition: Stable                        Dacia Quarles PA-C  12/12/18 6606
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
## 2020-03-20 ENCOUNTER — HOSPITAL ENCOUNTER (EMERGENCY)
Facility: HOSPITAL | Age: 45
Discharge: HOME | End: 2020-03-20
Attending: EMERGENCY MEDICINE
Payer: MEDICAID

## 2020-03-20 VITALS
WEIGHT: 235 LBS | SYSTOLIC BLOOD PRESSURE: 122 MMHG | OXYGEN SATURATION: 99 % | BODY MASS INDEX: 35.61 KG/M2 | HEART RATE: 79 BPM | HEIGHT: 68 IN | TEMPERATURE: 99 F | DIASTOLIC BLOOD PRESSURE: 74 MMHG | RESPIRATION RATE: 18 BRPM

## 2020-03-20 DIAGNOSIS — Z76.0 ENCOUNTER FOR MEDICATION REFILL: Primary | ICD-10-CM

## 2020-03-20 DIAGNOSIS — E11.9 TYPE 2 DIABETES MELLITUS WITHOUT COMPLICATION, UNSPECIFIED WHETHER LONG TERM INSULIN USE: ICD-10-CM

## 2020-03-20 DIAGNOSIS — R73.9 HYPERGLYCEMIA: ICD-10-CM

## 2020-03-20 PROCEDURE — 99282 EMERGENCY DEPT VISIT SF MDM: CPT | Mod: 25,ER

## 2020-03-20 PROCEDURE — 82962 GLUCOSE BLOOD TEST: CPT | Mod: ER

## 2020-03-20 RX ORDER — INSULIN ASPART 100 [IU]/ML
INJECTION, SUSPENSION SUBCUTANEOUS
COMMUNITY
End: 2020-03-20 | Stop reason: SDUPTHER

## 2020-03-20 RX ORDER — LANCETS 33 GAUGE
1 EACH MISCELLANEOUS 4 TIMES DAILY
Qty: 1 EACH | Refills: 0 | Status: SHIPPED | OUTPATIENT
Start: 2020-03-20

## 2020-03-20 RX ORDER — INSULIN ASPART 100 [IU]/ML
10 INJECTION, SUSPENSION SUBCUTANEOUS
Qty: 3 ML | Refills: 1 | Status: SHIPPED | OUTPATIENT
Start: 2020-03-20

## 2020-03-20 NOTE — ED PROVIDER NOTES
Encounter Date: 3/20/2020    SCRIBE #1 NOTE: I, Jeffrey Elder, am scribing for, and in the presence of,  JUS Vergara. I have scribed the following portions of the note - Other sections scribed: HPI, ROS, PE.       History     Chief Complaint   Patient presents with    Medication Refill     pt states that he ran out of his insulin and test strips and his dr is not open.      Cristiano German is a 44 y.o. male with history of HTN and DM who presents to the ED for medication refill. Patient states he ran out of his insulin, glucose test strips, and lancets; his PCP was closed today. Patient denies fatigue, fever, rash, chest pain, SOB, numbness, weakness, tingling, abdominal pain, back pain, dysuria, hematuria, nausea, vomiting, diarrhea, or any other complaints.  Patient denies any symptoms but states that he did take his insulin PTA.      The history is provided by the patient. No  was used.     Review of patient's allergies indicates:  No Known Allergies  Past Medical History:   Diagnosis Date    Diabetes mellitus 1/4/2016    Hypertension      Past Surgical History:   Procedure Laterality Date    CATARACT EXTRACTION       Family History   Problem Relation Age of Onset    Cancer Mother         lung    Hypertension Brother      Social History     Tobacco Use    Smoking status: Never Smoker    Smokeless tobacco: Never Used   Substance Use Topics    Alcohol use: Yes     Comment: social drinker- few drinks per year    Drug use: No     Review of Systems   Constitutional: Negative for chills, fatigue and fever.   HENT: Negative for congestion, ear pain, rhinorrhea and sore throat.    Eyes: Negative for pain, discharge and redness.   Respiratory: Negative for cough and shortness of breath.    Cardiovascular: Negative for chest pain.   Gastrointestinal: Negative for abdominal pain, diarrhea, nausea and vomiting.   Genitourinary: Negative for dysuria and hematuria.   Musculoskeletal: Negative  for back pain, neck pain and neck stiffness.   Skin: Negative for rash.   Neurological: Negative for dizziness, weakness, light-headedness, numbness and headaches.   Psychiatric/Behavioral: Negative for confusion.       Physical Exam     Initial Vitals [03/20/20 1504]   BP Pulse Resp Temp SpO2   136/85 73 18 98.7 °F (37.1 °C) 100 %      MAP       --         Physical Exam    Nursing note and vitals reviewed.  Constitutional: Vital signs are normal. He appears well-developed and well-nourished. He is cooperative.  Non-toxic appearance. He does not appear ill.   HENT:   Head: Normocephalic and atraumatic.   Eyes: Conjunctivae are normal.   Neck: Normal range of motion.   Cardiovascular: Normal rate, regular rhythm, S1 normal, S2 normal and normal heart sounds. Exam reveals no gallop and no friction rub.    No murmur heard.  Pulmonary/Chest: Effort normal and breath sounds normal. No respiratory distress. He has no decreased breath sounds. He has no wheezes. He has no rhonchi. He has no rales.   Abdominal: Soft. Normal appearance. He exhibits no distension. There is no tenderness.   Neurological: He is alert and oriented to person, place, and time. GCS eye subscore is 4. GCS verbal subscore is 5. GCS motor subscore is 6.   Skin: Skin is warm, dry and intact. No rash noted.   Psychiatric: He has a normal mood and affect. His speech is normal and behavior is normal. Thought content normal.         ED Course   Procedures  Labs Reviewed   POCT GLUCOSE MONITORING CONTINUOUS          Imaging Results    None          Medical Decision Making:   History:   Old Medical Records: I decided to obtain old medical records.  Clinical Tests:   Lab Tests: Ordered and Reviewed       APC / Resident Notes:   This is an evaluation of a 44 y.o. male that presents to the Emergency Department for medication refill    Physical Exam shows a non-toxic, afebrile, and well appearing male. Normal physical exam    Vital signs are reassuring. If  available, previous records reviewed.   RESULTS:     My overall impression is hyperglycemia, medication refill. I considered, but at this time, do not suspect DKA, hypoglycemia, hyperglycemia.    ED Course: CBG. D/C Meds: Insulin, lancets, test strips. D/C Information: f/u, medications. The diagnosis, treatment plan, instructions for follow-up and reevaluation with PCP as well as ED return precautions were discussed and understanding was verbalized. All questions or concerns have been addressed.          Scribe Attestation:   Scribe #1: I performed the above scribed service and the documentation accurately describes the services I performed. I attest to the accuracy of the note.    Physician Attestation for Scribe:  Physician Attestation Statement for Scribe: I, JUS Vergara, reviewed documentation, as scribed by Jeffrey Elder in my presence, and it is both accurate and complete.                               Clinical Impression:     1. Encounter for medication refill    2. Hyperglycemia    3. Type 2 diabetes mellitus without complication, unspecified whether long term insulin use            Disposition:   Disposition: Discharged  Condition: Stable                        JUS Dietrich  03/20/20 1611

## 2022-02-06 ENCOUNTER — HOSPITAL ENCOUNTER (EMERGENCY)
Facility: HOSPITAL | Age: 47
Discharge: HOME | End: 2022-02-06
Attending: EMERGENCY MEDICINE
Payer: MEDICAID

## 2022-02-06 VITALS
SYSTOLIC BLOOD PRESSURE: 122 MMHG | BODY MASS INDEX: 34.07 KG/M2 | TEMPERATURE: 99 F | RESPIRATION RATE: 17 BRPM | HEIGHT: 69 IN | OXYGEN SATURATION: 99 % | DIASTOLIC BLOOD PRESSURE: 78 MMHG | WEIGHT: 230 LBS | HEART RATE: 74 BPM

## 2022-02-06 DIAGNOSIS — E11.65 HYPERGLYCEMIA DUE TO DIABETES MELLITUS: ICD-10-CM

## 2022-02-06 DIAGNOSIS — R10.13 EPIGASTRIC ABDOMINAL PAIN: ICD-10-CM

## 2022-02-06 DIAGNOSIS — R07.9 CHEST PAIN: ICD-10-CM

## 2022-02-06 DIAGNOSIS — K21.9 GASTROESOPHAGEAL REFLUX DISEASE, UNSPECIFIED WHETHER ESOPHAGITIS PRESENT: Primary | ICD-10-CM

## 2022-02-06 LAB
ALBUMIN SERPL BCP-MCNC: 3.6 G/DL (ref 3.5–5.2)
ALP SERPL-CCNC: 108 U/L (ref 55–135)
ALT SERPL W/O P-5'-P-CCNC: 35 U/L (ref 10–44)
AMPHET+METHAMPHET UR QL: NEGATIVE
ANION GAP SERPL CALC-SCNC: 10 MMOL/L (ref 8–16)
AST SERPL-CCNC: 25 U/L (ref 10–40)
B-OH-BUTYR BLD STRIP-SCNC: 0.1 MMOL/L (ref 0–0.5)
BACTERIA #/AREA URNS HPF: NEGATIVE /HPF
BARBITURATES UR QL SCN>200 NG/ML: NEGATIVE
BASOPHILS # BLD AUTO: 0.02 K/UL (ref 0–0.2)
BASOPHILS NFR BLD: 0.4 % (ref 0–1.9)
BENZODIAZ UR QL SCN>200 NG/ML: NEGATIVE
BILIRUB SERPL-MCNC: 1 MG/DL (ref 0.1–1)
BILIRUB UR QL STRIP: NEGATIVE
BUN SERPL-MCNC: 16 MG/DL (ref 6–20)
BZE UR QL SCN: NEGATIVE
CALCIUM SERPL-MCNC: 9 MG/DL (ref 8.7–10.5)
CANNABINOIDS UR QL SCN: NEGATIVE
CHLORIDE SERPL-SCNC: 96 MMOL/L (ref 95–110)
CLARITY UR: CLEAR
CO2 SERPL-SCNC: 24 MMOL/L (ref 23–29)
COLOR UR: COLORLESS
CREAT SERPL-MCNC: 1.1 MG/DL (ref 0.5–1.4)
CREAT UR-MCNC: 21 MG/DL (ref 23–375)
DIFFERENTIAL METHOD: NORMAL
EOSINOPHIL # BLD AUTO: 0.1 K/UL (ref 0–0.5)
EOSINOPHIL NFR BLD: 1.6 % (ref 0–8)
ERYTHROCYTE [DISTWIDTH] IN BLOOD BY AUTOMATED COUNT: 11.6 % (ref 11.5–14.5)
EST. GFR  (AFRICAN AMERICAN): >60 ML/MIN/1.73 M^2
EST. GFR  (NON AFRICAN AMERICAN): >60 ML/MIN/1.73 M^2
ETHANOL SERPL-MCNC: <5 MG/DL
GLUCOSE SERPL-MCNC: 408 MG/DL (ref 70–110)
GLUCOSE SERPL-MCNC: 444 MG/DL (ref 70–110)
GLUCOSE SERPL-MCNC: 698 MG/DL (ref 70–110)
GLUCOSE SERPL-MCNC: >600 MG/DL (ref 70–110)
GLUCOSE UR QL STRIP: ABNORMAL
HCT VFR BLD AUTO: 41.7 % (ref 40–54)
HGB BLD-MCNC: 14.6 G/DL (ref 14–18)
HGB UR QL STRIP: NEGATIVE
HYALINE CASTS #/AREA URNS LPF: 0 /LPF
IMM GRANULOCYTES # BLD AUTO: 0.02 K/UL (ref 0–0.04)
IMM GRANULOCYTES NFR BLD AUTO: 0.4 % (ref 0–0.5)
KETONES UR QL STRIP: NEGATIVE
LEUKOCYTE ESTERASE UR QL STRIP: NEGATIVE
LIPASE SERPL-CCNC: 58 U/L (ref 4–60)
LYMPHOCYTES # BLD AUTO: 1.8 K/UL (ref 1–4.8)
LYMPHOCYTES NFR BLD: 32.2 % (ref 18–48)
MAGNESIUM SERPL-MCNC: 1.9 MG/DL (ref 1.6–2.6)
MCH RBC QN AUTO: 29.9 PG (ref 27–31)
MCHC RBC AUTO-ENTMCNC: 35 G/DL (ref 32–36)
MCV RBC AUTO: 85 FL (ref 82–98)
MICROSCOPIC COMMENT: ABNORMAL
MONOCYTES # BLD AUTO: 0.5 K/UL (ref 0.3–1)
MONOCYTES NFR BLD: 8.3 % (ref 4–15)
NEUTROPHILS # BLD AUTO: 3.3 K/UL (ref 1.8–7.7)
NEUTROPHILS NFR BLD: 57.1 % (ref 38–73)
NITRITE UR QL STRIP: NEGATIVE
NRBC BLD-RTO: 0 /100 WBC
OPIATES UR QL SCN: NEGATIVE
PCP UR QL SCN>25 NG/ML: NEGATIVE
PH UR STRIP: 6 [PH] (ref 5–8)
PLATELET # BLD AUTO: 296 K/UL (ref 150–450)
PMV BLD AUTO: 9.5 FL (ref 9.2–12.9)
POTASSIUM SERPL-SCNC: 4.2 MMOL/L (ref 3.5–5.1)
PROT SERPL-MCNC: 6.6 G/DL (ref 6–8.4)
PROT UR QL STRIP: NEGATIVE
RBC # BLD AUTO: 4.89 M/UL (ref 4.6–6.2)
RBC #/AREA URNS HPF: 0 /HPF (ref 0–4)
SODIUM SERPL-SCNC: 130 MMOL/L (ref 136–145)
SP GR UR STRIP: 1.03 (ref 1–1.03)
SQUAMOUS #/AREA URNS HPF: 0 /HPF
TOXICOLOGY INFORMATION: ABNORMAL
TROPONIN I SERPL DL<=0.01 NG/ML-MCNC: <0.03 NG/ML
TROPONIN I SERPL DL<=0.01 NG/ML-MCNC: <0.03 NG/ML
URN SPEC COLLECT METH UR: ABNORMAL
UROBILINOGEN UR STRIP-ACNC: NEGATIVE EU/DL
WBC # BLD AUTO: 5.69 K/UL (ref 3.9–12.7)
WBC #/AREA URNS HPF: 0 /HPF (ref 0–5)
YEAST URNS QL MICRO: ABNORMAL

## 2022-02-06 PROCEDURE — 63600175 PHARM REV CODE 636 W HCPCS: Performed by: NURSE PRACTITIONER

## 2022-02-06 PROCEDURE — 93010 ELECTROCARDIOGRAM REPORT: CPT | Mod: ,,, | Performed by: SPECIALIST

## 2022-02-06 PROCEDURE — 83735 ASSAY OF MAGNESIUM: CPT | Performed by: NURSE PRACTITIONER

## 2022-02-06 PROCEDURE — 85025 COMPLETE CBC W/AUTO DIFF WBC: CPT | Performed by: NURSE PRACTITIONER

## 2022-02-06 PROCEDURE — 80053 COMPREHEN METABOLIC PANEL: CPT | Performed by: NURSE PRACTITIONER

## 2022-02-06 PROCEDURE — 93005 ELECTROCARDIOGRAM TRACING: CPT | Performed by: SPECIALIST

## 2022-02-06 PROCEDURE — 82010 KETONE BODYS QUAN: CPT | Performed by: NURSE PRACTITIONER

## 2022-02-06 PROCEDURE — 99285 EMERGENCY DEPT VISIT HI MDM: CPT | Mod: 25

## 2022-02-06 PROCEDURE — 96374 THER/PROPH/DIAG INJ IV PUSH: CPT

## 2022-02-06 PROCEDURE — 82077 ASSAY SPEC XCP UR&BREATH IA: CPT | Performed by: NURSE PRACTITIONER

## 2022-02-06 PROCEDURE — 84484 ASSAY OF TROPONIN QUANT: CPT | Mod: 91 | Performed by: NURSE PRACTITIONER

## 2022-02-06 PROCEDURE — 25000003 PHARM REV CODE 250: Performed by: NURSE PRACTITIONER

## 2022-02-06 PROCEDURE — 25500020 PHARM REV CODE 255: Performed by: NURSE PRACTITIONER

## 2022-02-06 PROCEDURE — 96361 HYDRATE IV INFUSION ADD-ON: CPT

## 2022-02-06 PROCEDURE — 83690 ASSAY OF LIPASE: CPT | Performed by: NURSE PRACTITIONER

## 2022-02-06 PROCEDURE — 81001 URINALYSIS AUTO W/SCOPE: CPT | Mod: 59 | Performed by: NURSE PRACTITIONER

## 2022-02-06 PROCEDURE — C9113 INJ PANTOPRAZOLE SODIUM, VIA: HCPCS | Performed by: NURSE PRACTITIONER

## 2022-02-06 PROCEDURE — 82962 GLUCOSE BLOOD TEST: CPT

## 2022-02-06 PROCEDURE — 93010 EKG 12-LEAD: ICD-10-PCS | Mod: ,,, | Performed by: SPECIALIST

## 2022-02-06 PROCEDURE — 80307 DRUG TEST PRSMV CHEM ANLYZR: CPT | Performed by: NURSE PRACTITIONER

## 2022-02-06 RX ORDER — LIDOCAINE HYDROCHLORIDE 20 MG/ML
10 SOLUTION OROPHARYNGEAL ONCE
Status: COMPLETED | OUTPATIENT
Start: 2022-02-06 | End: 2022-02-06

## 2022-02-06 RX ORDER — PANTOPRAZOLE SODIUM 40 MG/10ML
40 INJECTION, POWDER, LYOPHILIZED, FOR SOLUTION INTRAVENOUS
Status: COMPLETED | OUTPATIENT
Start: 2022-02-06 | End: 2022-02-06

## 2022-02-06 RX ORDER — ONDANSETRON 4 MG/1
4 TABLET, FILM COATED ORAL EVERY 6 HOURS
Qty: 12 TABLET | Refills: 0 | Status: SHIPPED | OUTPATIENT
Start: 2022-02-06

## 2022-02-06 RX ORDER — PANTOPRAZOLE SODIUM 20 MG/1
20 TABLET, DELAYED RELEASE ORAL DAILY
Qty: 30 TABLET | Refills: 0 | Status: SHIPPED | OUTPATIENT
Start: 2022-02-06 | End: 2023-02-06

## 2022-02-06 RX ORDER — SUCRALFATE 1 G/1
1 TABLET ORAL
Qty: 20 TABLET | Refills: 0 | Status: SHIPPED | OUTPATIENT
Start: 2022-02-06 | End: 2022-02-11

## 2022-02-06 RX ORDER — MAG HYDROX/ALUMINUM HYD/SIMETH 200-200-20
30 SUSPENSION, ORAL (FINAL DOSE FORM) ORAL ONCE
Status: COMPLETED | OUTPATIENT
Start: 2022-02-06 | End: 2022-02-06

## 2022-02-06 RX ADMIN — SODIUM CHLORIDE 1000 ML: 0.9 INJECTION, SOLUTION INTRAVENOUS at 03:02

## 2022-02-06 RX ADMIN — IOHEXOL 100 ML: 350 INJECTION, SOLUTION INTRAVENOUS at 05:02

## 2022-02-06 RX ADMIN — SODIUM CHLORIDE 1000 ML: 0.9 INJECTION, SOLUTION INTRAVENOUS at 08:02

## 2022-02-06 RX ADMIN — LIDOCAINE HYDROCHLORIDE 10 ML: 20 SOLUTION ORAL; TOPICAL at 03:02

## 2022-02-06 RX ADMIN — ALUMINA, MAGNESIA, AND SIMETHICONE ORAL SUSPENSION REGULAR STRENGTH 30 ML: 1200; 1200; 120 SUSPENSION ORAL at 03:02

## 2022-02-06 RX ADMIN — PANTOPRAZOLE SODIUM 40 MG: 40 INJECTION, POWDER, FOR SOLUTION INTRAVENOUS at 07:02

## 2022-02-06 NOTE — Clinical Note
"Cristiano "Cristianonory German was seen and treated in our emergency department on 2/6/2022.  He may return to work on 02/08/2022.       If you have any questions or concerns, please don't hesitate to call.      Anyi Laurent NP"

## 2022-02-06 NOTE — ED NOTES
Pt states he is supposed to be on insulin, but doesn't take it because it doesn't help the blood sugars.
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
## 2022-05-19 ENCOUNTER — HOSPITAL ENCOUNTER (EMERGENCY)
Facility: HOSPITAL | Age: 47
Discharge: HOME | End: 2022-05-19
Attending: EMERGENCY MEDICINE
Payer: MEDICAID

## 2022-05-19 VITALS
TEMPERATURE: 98 F | OXYGEN SATURATION: 99 % | DIASTOLIC BLOOD PRESSURE: 76 MMHG | RESPIRATION RATE: 18 BRPM | BODY MASS INDEX: 32.14 KG/M2 | WEIGHT: 217 LBS | HEART RATE: 72 BPM | HEIGHT: 69 IN | SYSTOLIC BLOOD PRESSURE: 119 MMHG

## 2022-05-19 DIAGNOSIS — R42 DIZZINESS: Primary | ICD-10-CM

## 2022-05-19 DIAGNOSIS — R07.9 CHEST PAIN: ICD-10-CM

## 2022-05-19 LAB
ALBUMIN SERPL BCP-MCNC: 3.7 G/DL (ref 3.5–5.2)
ALP SERPL-CCNC: 65 U/L (ref 55–135)
ALT SERPL W/O P-5'-P-CCNC: 32 U/L (ref 10–44)
ANION GAP SERPL CALC-SCNC: 9 MMOL/L (ref 8–16)
AST SERPL-CCNC: 22 U/L (ref 10–40)
BASOPHILS # BLD AUTO: 0.03 K/UL (ref 0–0.2)
BASOPHILS NFR BLD: 0.4 % (ref 0–1.9)
BILIRUB SERPL-MCNC: 1.1 MG/DL (ref 0.1–1)
BNP SERPL-MCNC: 12 PG/ML (ref 0–99)
BUN SERPL-MCNC: 16 MG/DL (ref 6–20)
CALCIUM SERPL-MCNC: 9.5 MG/DL (ref 8.7–10.5)
CHLORIDE SERPL-SCNC: 106 MMOL/L (ref 95–110)
CK SERPL-CCNC: 217 U/L (ref 20–200)
CO2 SERPL-SCNC: 25 MMOL/L (ref 23–29)
CREAT SERPL-MCNC: 0.8 MG/DL (ref 0.5–1.4)
DIFFERENTIAL METHOD: ABNORMAL
EOSINOPHIL # BLD AUTO: 0.1 K/UL (ref 0–0.5)
EOSINOPHIL NFR BLD: 1.5 % (ref 0–8)
ERYTHROCYTE [DISTWIDTH] IN BLOOD BY AUTOMATED COUNT: 12 % (ref 11.5–14.5)
EST. GFR  (AFRICAN AMERICAN): >60 ML/MIN/1.73 M^2
EST. GFR  (NON AFRICAN AMERICAN): >60 ML/MIN/1.73 M^2
GLUCOSE SERPL-MCNC: 131 MG/DL (ref 70–110)
HCT VFR BLD AUTO: 45 % (ref 40–54)
HGB BLD-MCNC: 15.4 G/DL (ref 14–18)
IMM GRANULOCYTES # BLD AUTO: 0.01 K/UL (ref 0–0.04)
IMM GRANULOCYTES NFR BLD AUTO: 0.1 % (ref 0–0.5)
LYMPHOCYTES # BLD AUTO: 3.3 K/UL (ref 1–4.8)
LYMPHOCYTES NFR BLD: 46.3 % (ref 18–48)
MCH RBC QN AUTO: 30.1 PG (ref 27–31)
MCHC RBC AUTO-ENTMCNC: 34.2 G/DL (ref 32–36)
MCV RBC AUTO: 88 FL (ref 82–98)
MONOCYTES # BLD AUTO: 0.5 K/UL (ref 0.3–1)
MONOCYTES NFR BLD: 6.6 % (ref 4–15)
NEUTROPHILS # BLD AUTO: 3.2 K/UL (ref 1.8–7.7)
NEUTROPHILS NFR BLD: 45.1 % (ref 38–73)
NRBC BLD-RTO: 0 /100 WBC
PLATELET # BLD AUTO: 346 K/UL (ref 150–450)
PMV BLD AUTO: 8.7 FL (ref 9.2–12.9)
POTASSIUM SERPL-SCNC: 4.1 MMOL/L (ref 3.5–5.1)
PROT SERPL-MCNC: 7 G/DL (ref 6–8.4)
RBC # BLD AUTO: 5.11 M/UL (ref 4.6–6.2)
SODIUM SERPL-SCNC: 140 MMOL/L (ref 136–145)
TROPONIN I SERPL DL<=0.01 NG/ML-MCNC: <0.03 NG/ML
TROPONIN I SERPL DL<=0.01 NG/ML-MCNC: <0.03 NG/ML
WBC # BLD AUTO: 7.1 K/UL (ref 3.9–12.7)

## 2022-05-19 PROCEDURE — 96361 HYDRATE IV INFUSION ADD-ON: CPT

## 2022-05-19 PROCEDURE — 84484 ASSAY OF TROPONIN QUANT: CPT | Performed by: EMERGENCY MEDICINE

## 2022-05-19 PROCEDURE — 99284 EMERGENCY DEPT VISIT MOD MDM: CPT | Mod: 25

## 2022-05-19 PROCEDURE — 93010 ELECTROCARDIOGRAM REPORT: CPT | Mod: ,,, | Performed by: SPECIALIST

## 2022-05-19 PROCEDURE — 93005 ELECTROCARDIOGRAM TRACING: CPT | Performed by: SPECIALIST

## 2022-05-19 PROCEDURE — 93010 EKG 12-LEAD: ICD-10-PCS | Mod: ,,, | Performed by: SPECIALIST

## 2022-05-19 PROCEDURE — 83880 ASSAY OF NATRIURETIC PEPTIDE: CPT | Performed by: EMERGENCY MEDICINE

## 2022-05-19 PROCEDURE — 63600175 PHARM REV CODE 636 W HCPCS: Performed by: EMERGENCY MEDICINE

## 2022-05-19 PROCEDURE — 96360 HYDRATION IV INFUSION INIT: CPT

## 2022-05-19 PROCEDURE — 25000003 PHARM REV CODE 250: Performed by: EMERGENCY MEDICINE

## 2022-05-19 PROCEDURE — 85025 COMPLETE CBC W/AUTO DIFF WBC: CPT | Performed by: EMERGENCY MEDICINE

## 2022-05-19 PROCEDURE — 82550 ASSAY OF CK (CPK): CPT | Performed by: EMERGENCY MEDICINE

## 2022-05-19 PROCEDURE — 80053 COMPREHEN METABOLIC PANEL: CPT | Performed by: EMERGENCY MEDICINE

## 2022-05-19 RX ORDER — TIZANIDINE 4 MG/1
4 TABLET ORAL NIGHTLY
COMMUNITY
Start: 2022-04-13

## 2022-05-19 RX ORDER — MELOXICAM 15 MG/1
15 TABLET ORAL DAILY
COMMUNITY
Start: 2022-04-13

## 2022-05-19 RX ORDER — SITAGLIPTIN 100 MG/1
100 TABLET, FILM COATED ORAL DAILY
COMMUNITY
Start: 2022-05-19

## 2022-05-19 RX ORDER — ASPIRIN 325 MG
325 TABLET ORAL
Status: COMPLETED | OUTPATIENT
Start: 2022-05-19 | End: 2022-05-19

## 2022-05-19 RX ORDER — OMEPRAZOLE 20 MG/1
20 CAPSULE, DELAYED RELEASE ORAL EVERY MORNING
COMMUNITY
Start: 2022-03-02

## 2022-05-19 RX ORDER — ONDANSETRON 4 MG/1
4 TABLET, ORALLY DISINTEGRATING ORAL
Status: COMPLETED | OUTPATIENT
Start: 2022-05-19 | End: 2022-05-19

## 2022-05-19 RX ORDER — TRAMADOL HYDROCHLORIDE 50 MG/1
50 TABLET ORAL DAILY PRN
COMMUNITY
Start: 2022-04-13

## 2022-05-19 RX ORDER — INSULIN GLARGINE 100 [IU]/ML
18 INJECTION, SOLUTION SUBCUTANEOUS 3 TIMES DAILY
COMMUNITY
Start: 2022-05-02

## 2022-05-19 RX ADMIN — SODIUM CHLORIDE, SODIUM LACTATE, POTASSIUM CHLORIDE, AND CALCIUM CHLORIDE 1000 ML: .6; .31; .03; .02 INJECTION, SOLUTION INTRAVENOUS at 04:05

## 2022-05-19 RX ADMIN — ONDANSETRON 4 MG: 4 TABLET, ORALLY DISINTEGRATING ORAL at 02:05

## 2022-05-19 RX ADMIN — ASPIRIN 325 MG ORAL TABLET 325 MG: 325 PILL ORAL at 02:05

## 2022-05-19 NOTE — ED NOTES
Report received from [facility].  No orders carried out.  ekg done at 12:54 pm by original primary nurse.  Nurse reports not being able to get a working line on pt.

## 2022-05-19 NOTE — ED PROVIDER NOTES
Encounter Date: 5/19/2022       History     Chief Complaint   Patient presents with    Dizziness     46-year-old male presented emergency department complaining of dizziness.  Patient was at a court house and patient states he felt dehydrated as it was hot today and told the officer he was feeling dizzy.  Patient states now the dizziness is better but has slight pain in the chest.  Denies dysuria or hematuria.  Denies any weakness or numbness or fever chills.  Denies any shortness of breath.  Patient has history of diabetes and hypertension.  Patient could not tell me if anything makes his chest pain or dizziness better or worse and dizziness now almost resolved.  Patient denies any focal weakness.  Patient describes this as mild chest pain like someone punched and his chest and improving as well.  Patient rates pain as 4/10 and does not radiate anywhere.        Review of patient's allergies indicates:  No Known Allergies  Past Medical History:   Diagnosis Date    Diabetes mellitus 1/4/2016    Hypertension      Past Surgical History:   Procedure Laterality Date    CATARACT EXTRACTION       Family History   Problem Relation Age of Onset    Cancer Mother         lung    Hypertension Brother      Social History     Tobacco Use    Smoking status: Never Smoker    Smokeless tobacco: Never Used   Substance Use Topics    Alcohol use: Yes     Comment: social drinker- few drinks per year    Drug use: No     Review of Systems   Constitutional: Negative.    HENT: Negative.    Eyes: Negative.    Respiratory: Negative.    Cardiovascular: Positive for chest pain.   Gastrointestinal: Negative.    Endocrine: Negative.    Genitourinary: Negative.    Musculoskeletal: Negative.    Skin: Negative.    Allergic/Immunologic: Negative.    Neurological: Positive for dizziness.   Hematological: Negative.    Psychiatric/Behavioral: Negative.    All other systems reviewed and are negative.      Physical Exam     Initial Vitals  [05/19/22 1240]   BP Pulse Resp Temp SpO2   129/82 67 18 98 °F (36.7 °C) 97 %      MAP       --         Physical Exam    Nursing note and vitals reviewed.  Constitutional: He appears well-developed and well-nourished.   HENT:   Head: Normocephalic and atraumatic.   Nose: Nose normal.   Eyes: Conjunctivae and EOM are normal.   Neck: No tracheal deviation present.   Normal range of motion.  Cardiovascular: Normal rate, regular rhythm, normal heart sounds and intact distal pulses. Exam reveals no friction rub.    No murmur heard.  Pulmonary/Chest: Breath sounds normal. No respiratory distress. He has no wheezes. He has no rales.   Abdominal: Abdomen is soft. He exhibits no distension. There is no abdominal tenderness.   Musculoskeletal:         General: No tenderness or edema. Normal range of motion.      Cervical back: Normal range of motion.     Neurological: He is alert and oriented to person, place, and time. He has normal strength. No cranial nerve deficit or sensory deficit. GCS score is 15. GCS eye subscore is 4. GCS verbal subscore is 5. GCS motor subscore is 6.   Skin: Skin is warm and dry. Capillary refill takes less than 2 seconds.   Psychiatric: He has a normal mood and affect. Thought content normal.         ED Course   Procedures  Labs Reviewed   CBC W/ AUTO DIFFERENTIAL - Abnormal; Notable for the following components:       Result Value    MPV 8.7 (*)     All other components within normal limits   COMPREHENSIVE METABOLIC PANEL - Abnormal; Notable for the following components:    Glucose 131 (*)     Total Bilirubin 1.1 (*)     All other components within normal limits   CK - Abnormal; Notable for the following components:     (*)     All other components within normal limits   TROPONIN I   TROPONIN I   B-TYPE NATRIURETIC PEPTIDE   DRUG SCREEN PANEL, URINE EMERGENCY     EKG Readings: (Independently Interpreted)   Initial Reading: No STEMI. Rhythm: Normal Sinus Rhythm. Ectopy: No Ectopy.  Conduction: Normal.       Imaging Results          X-Ray Chest AP Portable (Final result)  Result time 05/19/22 13:20:25    Final result by Samuel De Anda MD (05/19/22 13:20:25)                 Narrative:    XR CHEST 1 VIEW    CLINICAL HISTORY:  46 years Male Chest Pain    COMPARISON: February 6, 2022    FINDINGS: Cardiac silhouette size is within normal limits. No confluent airspace disease. No large pleural effusion or pneumothorax. No acute osseous abnormality.    IMPRESSION: No acute pulmonary process.    Electronically signed by:  Samuel De Anda MD  5/19/2022 1:20 PM CDT Workstation: [id number]                               Medications   aspirin tablet 325 mg (325 mg Oral Given 5/19/22 8393)   lactated ringers bolus 1,000 mL (0 mLs Intravenous Stopped 5/19/22 8021)   ondansetron disintegrating tablet 4 mg (4 mg Oral Given 5/19/22 7783)     Medical Decision Making:   Differential Diagnosis:   46-year-old male presented emergency department with dizziness and mild chest pain.  Patient now is feeling better.  Patient said he felt overheated and dehydrated.  Patient is more relaxed at this time.  Screening labs reviewed.  Patient is symptom free after re-evaluation.  Patient did have improvement of symptoms and patient feeling better.  As feeling better discharged with instructions and follow-up.  Patient to follow-up with primary care provider and get outpatient stress test and I do not believe this is cardiac stress test.  Patient was at court house and was very anxious when this happened.  Patient now is completely at baseline and symptom free.  Two sets of troponin negative.  Discharged with return precautions  Clinical Tests:   Lab Tests: Reviewed  Radiological Study: Reviewed  Medical Tests: Reviewed                      Clinical Impression:   Final diagnoses:  [R07.9] Chest pain  [R42] Dizziness (Primary)          ED Disposition Condition    Discharge Stable        ED Prescriptions     None         Follow-up Information     Follow up With Specialties Details Why Contact Info    [facility] In 2 days  [address]  [facility] [address]  [phone]             Tim Sharma MD  05/19/22 2011

## 2022-05-20 NOTE — DISCHARGE INSTRUCTIONS
Drink lots of fluids.  Rest.  Follow up with cardiologist for outpatient stress test.  Return to emergency department for worsening symptoms or any problems.

## 2022-05-20 NOTE — ED NOTES
Pt given lunch box per md order.  Pt took off all monitoring and asked if he would keep them on.  Pt refused at this time.

## 2024-08-13 ENCOUNTER — HOSPITAL ENCOUNTER (EMERGENCY)
Facility: HOSPITAL | Age: 49
Discharge: HOME | End: 2024-08-13
Attending: STUDENT IN AN ORGANIZED HEALTH CARE EDUCATION/TRAINING PROGRAM
Payer: COMMERCIAL

## 2024-08-13 VITALS
BODY MASS INDEX: 32.14 KG/M2 | DIASTOLIC BLOOD PRESSURE: 74 MMHG | WEIGHT: 217 LBS | SYSTOLIC BLOOD PRESSURE: 122 MMHG | HEART RATE: 84 BPM | OXYGEN SATURATION: 98 % | HEIGHT: 69 IN | TEMPERATURE: 98 F | RESPIRATION RATE: 16 BRPM

## 2024-08-13 DIAGNOSIS — B34.9 VIRAL SYNDROME: ICD-10-CM

## 2024-08-13 DIAGNOSIS — R06.02 SOB (SHORTNESS OF BREATH): ICD-10-CM

## 2024-08-13 DIAGNOSIS — R05.9 COUGH: ICD-10-CM

## 2024-08-13 DIAGNOSIS — R73.9 HYPERGLYCEMIA: Primary | ICD-10-CM

## 2024-08-13 LAB
ALBUMIN SERPL BCP-MCNC: 3.3 G/DL (ref 3.5–5.2)
ALLENS TEST: NORMAL
ALP SERPL-CCNC: 159 U/L (ref 55–135)
ALT SERPL W/O P-5'-P-CCNC: 34 U/L (ref 10–44)
ANION GAP SERPL CALC-SCNC: 11 MMOL/L (ref 8–16)
AST SERPL-CCNC: 25 U/L (ref 10–40)
B-OH-BUTYR BLD STRIP-SCNC: 0.1 MMOL/L (ref 0–0.5)
BACTERIA #/AREA URNS AUTO: NORMAL /HPF
BASOPHILS # BLD AUTO: 0.02 K/UL (ref 0–0.2)
BASOPHILS NFR BLD: 0.3 % (ref 0–1.9)
BILIRUB SERPL-MCNC: 0.4 MG/DL (ref 0.1–1)
BILIRUB UR QL STRIP: NEGATIVE
BUN SERPL-MCNC: 10 MG/DL (ref 6–20)
CALCIUM SERPL-MCNC: 9.4 MG/DL (ref 8.7–10.5)
CHLORIDE SERPL-SCNC: 95 MMOL/L (ref 95–110)
CLARITY UR REFRACT.AUTO: CLEAR
CO2 SERPL-SCNC: 21 MMOL/L (ref 23–29)
COLOR UR AUTO: COLORLESS
CREAT SERPL-MCNC: 1.3 MG/DL (ref 0.5–1.4)
DIFFERENTIAL METHOD BLD: ABNORMAL
EOSINOPHIL # BLD AUTO: 0.1 K/UL (ref 0–0.5)
EOSINOPHIL NFR BLD: 1.4 % (ref 0–8)
ERYTHROCYTE [DISTWIDTH] IN BLOOD BY AUTOMATED COUNT: 11.9 % (ref 11.5–14.5)
EST. GFR  (NO RACE VARIABLE): >60 ML/MIN/1.73 M^2
GLUCOSE SERPL-MCNC: 684 MG/DL (ref 70–110)
GLUCOSE UR QL STRIP: ABNORMAL
HCO3 UR-SCNC: 25.2 MMOL/L (ref 24–28)
HCT VFR BLD AUTO: 40.2 % (ref 40–54)
HGB BLD-MCNC: 13.6 G/DL (ref 14–18)
HGB UR QL STRIP: NEGATIVE
IMM GRANULOCYTES # BLD AUTO: 0.02 K/UL (ref 0–0.04)
IMM GRANULOCYTES NFR BLD AUTO: 0.3 % (ref 0–0.5)
KETONES UR QL STRIP: NEGATIVE
LEUKOCYTE ESTERASE UR QL STRIP: NEGATIVE
LYMPHOCYTES # BLD AUTO: 2.3 K/UL (ref 1–4.8)
LYMPHOCYTES NFR BLD: 35.9 % (ref 18–48)
MCH RBC QN AUTO: 29.3 PG (ref 27–31)
MCHC RBC AUTO-ENTMCNC: 33.8 G/DL (ref 32–36)
MCV RBC AUTO: 87 FL (ref 82–98)
MICROSCOPIC COMMENT: NORMAL
MONOCYTES # BLD AUTO: 0.5 K/UL (ref 0.3–1)
MONOCYTES NFR BLD: 8.6 % (ref 4–15)
NEUTROPHILS # BLD AUTO: 3.3 K/UL (ref 1.8–7.7)
NEUTROPHILS NFR BLD: 53.5 % (ref 38–73)
NITRITE UR QL STRIP: NEGATIVE
NRBC BLD-RTO: 0 /100 WBC
PCO2 BLDA: 42.1 MMHG (ref 35–45)
PH SMN: 7.38 [PH] (ref 7.35–7.45)
PH UR STRIP: 6 [PH] (ref 5–8)
PLATELET # BLD AUTO: 298 K/UL (ref 150–450)
PMV BLD AUTO: 9.9 FL (ref 9.2–12.9)
PO2 BLDA: 45 MMHG (ref 40–60)
POC BE: 0 MMOL/L
POC SATURATED O2: 80 % (ref 95–100)
POC TCO2: 26 MMOL/L (ref 24–29)
POCT GLUCOSE: 385 MG/DL (ref 70–110)
POCT GLUCOSE: >500 MG/DL (ref 70–110)
POCT GLUCOSE: >500 MG/DL (ref 70–110)
POTASSIUM SERPL-SCNC: 4.4 MMOL/L (ref 3.5–5.1)
PROT SERPL-MCNC: 6.9 G/DL (ref 6–8.4)
PROT UR QL STRIP: NEGATIVE
RBC # BLD AUTO: 4.64 M/UL (ref 4.6–6.2)
RBC #/AREA URNS AUTO: 1 /HPF (ref 0–4)
SAMPLE: NORMAL
SARS-COV-2 RDRP RESP QL NAA+PROBE: NEGATIVE
SITE: NORMAL
SODIUM SERPL-SCNC: 127 MMOL/L (ref 136–145)
SP GR UR STRIP: >=1.03 (ref 1–1.03)
URN SPEC COLLECT METH UR: ABNORMAL
WBC # BLD AUTO: 6.26 K/UL (ref 3.9–12.7)
WBC #/AREA URNS AUTO: 2 /HPF (ref 0–5)
YEAST UR QL AUTO: NORMAL

## 2024-08-13 PROCEDURE — 82803 BLOOD GASES ANY COMBINATION: CPT

## 2024-08-13 PROCEDURE — 63600175 PHARM REV CODE 636 W HCPCS: Performed by: PHYSICIAN ASSISTANT

## 2024-08-13 PROCEDURE — 80053 COMPREHEN METABOLIC PANEL: CPT | Performed by: PHYSICIAN ASSISTANT

## 2024-08-13 PROCEDURE — 99900035 HC TECH TIME PER 15 MIN (STAT)

## 2024-08-13 PROCEDURE — 85025 COMPLETE CBC W/AUTO DIFF WBC: CPT | Performed by: EMERGENCY MEDICINE

## 2024-08-13 PROCEDURE — U0002 COVID-19 LAB TEST NON-CDC: HCPCS | Performed by: EMERGENCY MEDICINE

## 2024-08-13 PROCEDURE — 82010 KETONE BODYS QUAN: CPT | Performed by: PHYSICIAN ASSISTANT

## 2024-08-13 PROCEDURE — 96374 THER/PROPH/DIAG INJ IV PUSH: CPT

## 2024-08-13 PROCEDURE — 25000003 PHARM REV CODE 250: Performed by: PHYSICIAN ASSISTANT

## 2024-08-13 PROCEDURE — 82962 GLUCOSE BLOOD TEST: CPT

## 2024-08-13 PROCEDURE — 96361 HYDRATE IV INFUSION ADD-ON: CPT

## 2024-08-13 PROCEDURE — 99284 EMERGENCY DEPT VISIT MOD MDM: CPT | Mod: 25

## 2024-08-13 PROCEDURE — 81001 URINALYSIS AUTO W/SCOPE: CPT | Performed by: PHYSICIAN ASSISTANT

## 2024-08-13 RX ORDER — INSULIN ASPART 100 [IU]/ML
10 INJECTION, SUSPENSION SUBCUTANEOUS
Qty: 10 ML | Refills: 0 | Status: SHIPPED | OUTPATIENT
Start: 2024-08-13 | End: 2024-08-13 | Stop reason: ALTCHOICE

## 2024-08-13 RX ORDER — DEXTROSE 4 G
TABLET,CHEWABLE ORAL
Qty: 1 EACH | Refills: 0 | Status: SHIPPED | OUTPATIENT
Start: 2024-08-13

## 2024-08-13 RX ORDER — INSULIN LISPRO 100 [IU]/ML
5 INJECTION, SOLUTION INTRAVENOUS; SUBCUTANEOUS
Qty: 10 ML | Refills: 0 | Status: SHIPPED | OUTPATIENT
Start: 2024-08-13 | End: 2024-10-19

## 2024-08-13 RX ADMIN — SODIUM CHLORIDE 1000 ML: 9 INJECTION, SOLUTION INTRAVENOUS at 06:08

## 2024-08-13 RX ADMIN — INSULIN HUMAN 6 UNITS: 100 INJECTION, SOLUTION PARENTERAL at 08:08

## 2024-08-13 NOTE — FIRST PROVIDER EVALUATION
Medical screening examination initiated.  I have conducted a focused provider triage encounter, findings are as follows:    Brief history of present illness:  48 M hx of DM here for  nausea and cough x 3 days, occasional diarrhea.     There were no vitals filed for this visit.    Pertinent physical exam:  sitting in wheelchair    Brief workup plan:  labs, CXR, covid    Preliminary workup initiated; this workup will be continued and followed by the physician or advanced practice provider that is assigned to the patient when roomed.

## 2024-08-13 NOTE — ED PROVIDER NOTES
Encounter Date: 8/13/2024       History     Chief Complaint   Patient presents with    Nausea     Nausea, vomiting, diarrhea, cough, and x 3 days      48-year-old male with history of DM and hypertension presents to the emergency department with chief complaint of nausea, vomiting, productive cough, body aches that began a few days ago.  Denies known sick contacts.  He denies chest pain or shortness of breath.  Denies abdominal pain.  He has occasional diarrhea.  No urinary symptoms.  He mentions that he has been out of his insulin for the last 4 months.  States that he is having issues with his insurance company covering his medication.  He recently saw his PCP.  Reports that his blood sugar was in the 500s at the time.  States that his PCP ordered him a new medication, but he has been unable to get it filled yet.  He denies other worsening or alleviating factors.      Review of patient's allergies indicates:  No Known Allergies  Past Medical History:   Diagnosis Date    Diabetes mellitus 1/4/2016    Hypertension      Past Surgical History:   Procedure Laterality Date    CATARACT EXTRACTION       Family History   Problem Relation Name Age of Onset    Cancer Mother          lung    Hypertension Brother       Social History     Tobacco Use    Smoking status: Never    Smokeless tobacco: Never   Substance Use Topics    Alcohol use: Yes     Comment: social drinker- few drinks per year    Drug use: No     Review of Systems   Respiratory:  Positive for cough.    Gastrointestinal:  Positive for nausea and vomiting.       Physical Exam     Initial Vitals [08/13/24 1618]   BP Pulse Resp Temp SpO2   120/77 90 20 98.8 °F (37.1 °C) 98 %      MAP       --         Physical Exam    Vitals reviewed.  Constitutional: He appears well-developed and well-nourished. He is not diaphoretic. No distress.   HENT:   Head: Normocephalic and atraumatic.   Mouth/Throat: Oropharynx is clear and moist.   Eyes: EOM are normal. Pupils are equal,  round, and reactive to light.   Neck: Neck supple.   Normal range of motion.  Cardiovascular:  Normal rate, regular rhythm, normal heart sounds and intact distal pulses.     Exam reveals no gallop and no friction rub.       No murmur heard.  Pulmonary/Chest: Breath sounds normal. He has no wheezes. He has no rhonchi. He has no rales.   Abdominal: Abdomen is soft. Bowel sounds are normal. There is no abdominal tenderness.   Musculoskeletal:         General: Normal range of motion.      Cervical back: Normal range of motion and neck supple.     Neurological: He is alert and oriented to person, place, and time. He has normal strength. GCS score is 15. GCS eye subscore is 4. GCS verbal subscore is 5. GCS motor subscore is 6.   Skin: Skin is warm and dry. Capillary refill takes less than 2 seconds.   Psychiatric: He has a normal mood and affect. His behavior is normal. Judgment and thought content normal.         ED Course   Procedures  Labs Reviewed   CBC W/ AUTO DIFFERENTIAL - Abnormal       Result Value    WBC 6.26      RBC 4.64      Hemoglobin 13.6 (*)     Hematocrit 40.2      MCV 87      MCH 29.3      MCHC 33.8      RDW 11.9      Platelets 298      MPV 9.9      Immature Granulocytes 0.3      Gran # (ANC) 3.3      Immature Grans (Abs) 0.02      Lymph # 2.3      Mono # 0.5      Eos # 0.1      Baso # 0.02      nRBC 0      Gran % 53.5      Lymph % 35.9      Mono % 8.6      Eosinophil % 1.4      Basophil % 0.3      Differential Method Automated     COMPREHENSIVE METABOLIC PANEL - Abnormal    Sodium 127 (*)     Potassium 4.4      Chloride 95      CO2 21 (*)     Glucose 684 (*)     BUN 10      Creatinine 1.3      Calcium 9.4      Total Protein 6.9      Albumin 3.3 (*)     Total Bilirubin 0.4      Alkaline Phosphatase 159 (*)     AST 25      ALT 34      eGFR >60.0      Anion Gap 11     URINALYSIS, REFLEX TO URINE CULTURE - Abnormal    Specimen UA Urine, Clean Catch      Color, UA Colorless (*)     Appearance, UA Clear       pH, UA 6.0      Specific Gravity, UA >=1.030 (*)     Protein, UA Negative      Glucose, UA 4+ (*)     Ketones, UA Negative      Bilirubin (UA) Negative      Occult Blood UA Negative      Nitrite, UA Negative      Leukocytes, UA Negative      Narrative:     Specimen Source->Urine   POCT GLUCOSE - Abnormal    POCT Glucose >500 (*)    POCT GLUCOSE - Abnormal    POCT Glucose >500 (*)    POCT GLUCOSE - Abnormal    POCT Glucose 385 (*)    SARS-COV-2 RNA AMPLIFICATION, QUAL    SARS-CoV-2 RNA, Amplification, Qual Negative     BETA - HYDROXYBUTYRATE, SERUM    Beta-Hydroxybutyrate 0.1     URINALYSIS MICROSCOPIC    RBC, UA 1      WBC, UA 2      Bacteria Rare      Yeast, UA None      Microscopic Comment SEE COMMENT      Narrative:     Specimen Source->Urine   ISTAT PROCEDURE    POC PH 7.385      POC PCO2 42.1      POC PO2 45      POC HCO3 25.2      POC BE 0      POC SATURATED O2 80      POC TCO2 26      Sample VENOUS      Site Other      Allens Test N/A     POCT GLUCOSE MONITORING CONTINUOUS          Imaging Results              X-Ray Chest PA And Lateral (Final result)  Result time 08/13/24 20:31:16      Final result by Ambrocio Castillo MD (08/13/24 20:31:16)                   Impression:      1. No acute cardiopulmonary process.      Electronically signed by: Amborcio Castillo MD  Date:    08/13/2024  Time:    20:31               Narrative:    EXAMINATION:  XR CHEST PA AND LATERAL    CLINICAL HISTORY:  Cough, unspecified    TECHNIQUE:  PA and lateral views of the chest were performed.    COMPARISON:  05/19/2022    FINDINGS:  The cardiomediastinal silhouette is not enlarged, magnified by technique..  There is no pleural effusion.  The trachea is midline.  The lungs are symmetrically expanded bilaterally with bilateral basilar subsegmental atelectasis..  No large focal consolidation seen.  There is no pneumothorax.  The osseous structures are unremarkable.                                    X-Rays:   Independently Interpreted  Readings:   Chest X-Ray: Normal heart size.  No infiltrates.  No acute abnormalities.     Medications   sodium chloride 0.9% bolus 1,000 mL 1,000 mL (0 mLs Intravenous Stopped 8/13/24 1949)   insulin regular injection 6 Units 0.06 mL (6 Units Intravenous Given 8/13/24 2007)     Medical Decision Making  Emergent evaluation of a 48 y.o. male presenting to the emergency department complaining of cough, nausea, vomiting, diarrhea. Patient is afebrile, hemodynamically stable, and non toxic appearing.   Will order labs, viral swabs, chest x-ray.    Differential diagnosis includes but isn't limited to COVID, viral syndrome, pneumonia, metabolic derangement, DKA.    Normal pH on VBG.  Hyponatremia of 27 glucose of 648.  Creatinine of 1.3.  No severe hematologic derangements.  Beta hydroxybutyrate 0.1.  UA with 4+ glucose, negative ketones.  No evidence of infection.  COVID swab negative.  Chest x-ray without acute abnormality.  I suspect the patient has a viral syndrome.  He also has very poorly controlled diabetes.  He has had issues filling his medication due to insurance.    He was previously prescribed short-acting insulin in the past.  I have sent a refill for this medication to our pharmacy along with a glucometer and test strips. He can take this until he can get the medication that he was prescribed by his PCP. No evidence of DKA at this time. Return precautions given. All questions answered.   The patient was instructed to follow up with a primary care provider or to return to the emergency department for worsening symptoms. The treatment plan was discussed with the patient who demonstrated understanding and comfort with plan. The patient's history, physical exam, and plan of care was discussed with and agreed upon with my supervising physician.                  ED Course as of 08/13/24 2139   Tue Aug 13, 2024   1942 WBC: 6.26 [JM]   1944 Hemoglobin(!): 13.6 [JM]   1944 Hematocrit: 40.2 []   1942 Platelet Count:  298 [JM]   1944 Glucose, UA(!): 4+ [JM]   1944 Ketones, UA: Negative [JM]   1944 Glucose(!!): 684 [JM]   1944 BUN: 10 [JM]   1944 Creatinine: 1.3 [JM]   1944 Beta-Hydroxybutyrate: 0.1 []      ED Course User Index  [JM] Lisa Pepper PA-C                           Clinical Impression:  Final diagnoses:  [R06.02] SOB (shortness of breath)  [R05.9] Cough  [R73.9] Hyperglycemia (Primary)  [B34.9] Viral syndrome          ED Disposition Condition    Discharge Stable          ED Prescriptions       Medication Sig Dispense Start Date End Date Auth. Provider    blood sugar diagnostic Strp 1 strip by Misc.(Non-Drug; Combo Route) route 4 (four) times daily. 100 each 8/13/2024 -- Lisa Pepper PA-C    insulin asp prt-insulin aspart, NovoLOG 70/30, (NOVOLOG 70/30) 100 unit/mL (70-30) Soln Inject 10 Units into the skin 2 (two) times daily before meals. 600 Units 8/13/2024 9/12/2024 Lisa Pepper PA-C    blood-glucose meter Misc Use as instructed 1 each 8/13/2024 -- Lisa Pepper PA-C          Follow-up Information       Follow up With Specialties Details Why Contact Info    [facility] Emergency Dept Emergency Medicine Go to  If symptoms worsen [address]  [facility] [address] [phone]    [facility] Schedule an appointment as soon as possible for a visit   [address] [facility] [address]  [phone]               Lisa Pepper PA-C  08/13/24 1826

## 2024-08-13 NOTE — ED NOTES
Patient identifiers verified and correct for Mr German  C/C: Cough, nausea, vomiting SEE NN  APPEARANCE: awake and alert in NAD. PAIN  0/10  SKIN: warm, dry and intact. No breakdown or bruising.  MUSCULOSKELETAL: Patient moving all extremities spontaneously, no obvious swelling or deformities noted. Ambulates independently.  RESPIRATORY: Denies shortness of breath.Respirations unlabored. Positive cough  CARDIAC: Denies CP, 2+ distal pulses; no peripheral edema  ABDOMEN: S/ND/NT, Denies nausea  : voids spontaneously, denies difficulty  Neurologic: AAO x 4; follows commands equal strength in all extremities; denies numbness/tingling. Denies dizziness  Denies new weakness,